# Patient Record
Sex: MALE | Race: WHITE | Employment: PART TIME | URBAN - METROPOLITAN AREA
[De-identification: names, ages, dates, MRNs, and addresses within clinical notes are randomized per-mention and may not be internally consistent; named-entity substitution may affect disease eponyms.]

---

## 2017-02-07 ENCOUNTER — HOSPITAL ENCOUNTER (OUTPATIENT)
Dept: ULTRASOUND IMAGING | Age: 52
Discharge: HOME | End: 2017-02-07
Attending: INTERNAL MEDICINE
Payer: SUBSIDIZED

## 2017-02-07 DIAGNOSIS — R10.11 RUQ ABDOMINAL PAIN: ICD-10-CM

## 2017-02-07 PROCEDURE — 76700 US EXAM ABDOM COMPLETE: CPT

## 2017-04-20 ENCOUNTER — HOSPITAL ENCOUNTER (OUTPATIENT)
Dept: LAB | Age: 52
Discharge: HOME | End: 2017-04-20

## 2017-04-20 LAB
BASOPHILS # BLD AUTO: 0 K/UL (ref 0–0.1)
BASOPHILS # BLD: 0 % (ref 0–1)
EOSINOPHIL # BLD: 0.1 K/UL (ref 0–0.4)
EOSINOPHIL NFR BLD: 1 % (ref 0–7)
ERYTHROCYTE [DISTWIDTH] IN BLOOD BY AUTOMATED COUNT: 12.8 % (ref 11.5–14.5)
ERYTHROCYTE [SEDIMENTATION RATE] IN BLOOD: 13 MM/HR (ref 0–15)
HCT VFR BLD AUTO: 45.5 % (ref 36.6–50.3)
HGB BLD-MCNC: 15.8 G/DL (ref 12.1–17)
LYMPHOCYTES # BLD AUTO: 44 % (ref 12–49)
LYMPHOCYTES # BLD: 2.3 K/UL (ref 0.8–3.5)
MCH RBC QN AUTO: 30 PG (ref 26–34)
MCHC RBC AUTO-ENTMCNC: 34.7 G/DL (ref 30–36.5)
MCV RBC AUTO: 86.3 FL (ref 80–99)
MONOCYTES # BLD: 0.3 K/UL (ref 0–1)
MONOCYTES NFR BLD AUTO: 5 % (ref 5–13)
NEUTS SEG # BLD: 2.5 K/UL (ref 1.8–8)
NEUTS SEG NFR BLD AUTO: 50 % (ref 32–75)
PLATELET # BLD AUTO: 190 K/UL (ref 150–400)
RBC # BLD AUTO: 5.27 M/UL (ref 4.1–5.7)
WBC # BLD AUTO: 5.2 K/UL (ref 4.1–11.1)

## 2017-04-20 PROCEDURE — 85025 COMPLETE CBC W/AUTO DIFF WBC: CPT | Performed by: NURSE PRACTITIONER

## 2017-04-20 PROCEDURE — 86038 ANTINUCLEAR ANTIBODIES: CPT | Performed by: NURSE PRACTITIONER

## 2017-04-20 PROCEDURE — 86803 HEPATITIS C AB TEST: CPT | Performed by: NURSE PRACTITIONER

## 2017-04-20 PROCEDURE — 86200 CCP ANTIBODY: CPT | Performed by: NURSE PRACTITIONER

## 2017-04-20 PROCEDURE — 86140 C-REACTIVE PROTEIN: CPT | Performed by: NURSE PRACTITIONER

## 2017-04-20 PROCEDURE — 85652 RBC SED RATE AUTOMATED: CPT | Performed by: NURSE PRACTITIONER

## 2017-04-21 LAB
CRP SERPL-MCNC: <0.29 MG/DL (ref 0–0.6)
HCV AB SERPL QL IA: NONREACTIVE
HCV COMMENT,HCGAC: NORMAL

## 2017-04-23 LAB
ANA SER QL: NEGATIVE
CCP IGA+IGG SERPL IA-ACNC: 13 UNITS (ref 0–19)
SEE BELOW:, 164879: NORMAL

## 2017-07-28 ENCOUNTER — HOSPITAL ENCOUNTER (EMERGENCY)
Age: 52
Discharge: HOME | End: 2017-07-28
Attending: EMERGENCY MEDICINE
Payer: SUBSIDIZED

## 2017-07-28 VITALS
HEIGHT: 66 IN | BODY MASS INDEX: 31.5 KG/M2 | RESPIRATION RATE: 16 BRPM | TEMPERATURE: 98.2 F | HEART RATE: 74 BPM | OXYGEN SATURATION: 97 % | WEIGHT: 196 LBS | SYSTOLIC BLOOD PRESSURE: 132 MMHG | DIASTOLIC BLOOD PRESSURE: 87 MMHG

## 2017-07-28 DIAGNOSIS — H43.392 FLOATERS IN VISUAL FIELD, LEFT: Primary | ICD-10-CM

## 2017-07-28 LAB
GLUCOSE BLD STRIP.AUTO-MCNC: 146 MG/DL (ref 65–100)
SERVICE CMNT-IMP: ABNORMAL

## 2017-07-28 PROCEDURE — 99284 EMERGENCY DEPT VISIT MOD MDM: CPT

## 2017-07-28 PROCEDURE — 82962 GLUCOSE BLOOD TEST: CPT

## 2017-07-28 PROCEDURE — 74011000250 HC RX REV CODE- 250: Performed by: EMERGENCY MEDICINE

## 2017-07-28 RX ADMIN — FLUORESCEIN SODIUM 1 STRIP: 1 STRIP OPHTHALMIC at 12:58

## 2017-07-28 NOTE — ED PROVIDER NOTES
HPI Comments: 54 y.o. male with past medical history significant for diabetes, HTN, obstructive sleep apnea, GERD, and fatty liver disease who presents from home with chief complaint of visual disturbance. Patient states that he has been having floaters and gray spots in his left eye since yesterday. He reportedly went to his eye doctor a couple weeks ago and had a normal exam. He reports having Lasik surgery a few years ago without complications. Pt denies having any eye curtains or flashes. He denies having any eye pain. There are no other acute medical concerns at this time. Social hx: nonsmoker, no EtOH use, no drug use  PCP: Jacinda Tyler MD    Note written by Castro Henderson, as dictated by Rina Marin MD 12:54 PM      The history is provided by the patient. No  was used. Past Medical History:   Diagnosis Date    Diabetes ([facility] [address]     Esophageal reflux     reported by patient has had 2 endoscopies in [facility] liver     reported by patient seen on u/s in [facility] Hypertension     Sleep apnea, obstructive 4/2014    AHI 32.3, does not have machine       History reviewed. No pertinent surgical history. Family History:   Problem Relation Age of Onset    Heart Disease Father        Social History     Social History    Marital status:      Spouse name: N/A    Number of children: N/A    Years of education: N/A     Occupational History    Not on file. Social History Main Topics    Smoking status: Never Smoker    Smokeless tobacco: Never Used    Alcohol use No    Drug use: No    Sexual activity: Yes     Partners: Female      Comment:      Other Topics Concern    Not on file     Social History Narrative         ALLERGIES: Review of patient's allergies indicates no known allergies. Review of Systems   Constitutional: Negative for activity change and fever. Eyes: Positive for visual disturbance. Negative for pain. Respiratory: Negative for cough and shortness of breath. Cardiovascular: Negative for chest pain and leg swelling. Gastrointestinal: Negative for abdominal pain. Genitourinary: Negative for flank pain and hematuria. Musculoskeletal: Negative for gait problem, neck pain and neck stiffness. Skin: Negative for color change. Neurological: Negative for speech difficulty and headaches. Hematological: Does not bruise/bleed easily. Psychiatric/Behavioral: Negative for confusion. All other systems reviewed and are negative. Vitals:    07/28/17 1242   BP: 126/82   Pulse: 83   Resp: 15   Temp: 98.3 °F (36.8 °C)   SpO2: 98%   Weight: 88.9 kg (196 lb)   Height: 5' 6\" (1.676 m)            Physical Exam   Constitutional: He is oriented to person, place, and time. He appears well-developed and well-nourished. No distress. HENT:   Head: Normocephalic and atraumatic. Right Ear: External ear normal.   Left Ear: External ear normal.   Eyes: EOM and lids are normal. Pupils are equal, round, and reactive to light. Right eye exhibits normal extraocular motion. Left eye exhibits normal extraocular motion. Slit lamp exam:       The left eye shows no corneal abrasion. Pupils 4 mm. Normal visual field and acuity. Neck: Normal range of motion. Neck supple. No JVD present. No tracheal deviation present. Cardiovascular: Normal rate, regular rhythm and normal heart sounds. Exam reveals no gallop and no friction rub. No murmur heard. Pulmonary/Chest: Effort normal and breath sounds normal. No stridor. No respiratory distress. He has no wheezes. He has no rales. Abdominal: Soft. Bowel sounds are normal. He exhibits no distension. There is no tenderness. There is no rebound and no guarding. Musculoskeletal: Normal range of motion. He exhibits no edema or tenderness. Neurological: He is alert and oriented to person, place, and time. He has normal reflexes. No cranial nerve deficit.  Coordination normal. Skin: Skin is warm and dry. No rash noted. He is not diaphoretic. No erythema. Psychiatric: He has a normal mood and affect. His behavior is normal. Judgment and thought content normal.   Nursing note and vitals reviewed. Note written by Castro Lin, as dictated by Jackelyn Treviño MD 12:55 PM    MDM  Number of Diagnoses or Management Options  Diagnosis management comments: 51-year-old male presents with floaters in the left eye. No eye pain. Visual acuity is normal. My exam is normal. He'll need to see ophthalmology. We'll call and see if they can see him either today or Monday. Amount and/or Complexity of Data Reviewed  Decide to obtain previous medical records or to obtain history from someone other than the patient: yes  Review and summarize past medical records: yes  Independent visualization of images, tracings, or specimens: yes    Risk of Complications, Morbidity, and/or Mortality  Presenting problems: low  Diagnostic procedures: low  Management options: low    Patient Progress  Patient progress: improved    ED Course       Procedures  PROGRESS NOTE:  2:12 PM Pt has an appointment with Dr. Alex Manning at [facility] today. We were able to make this appt for the pt. He is on his way there now. 2:13 PM  Good return precautions given to patient. Close follow up with PCP recommended. Patient and/or family voices understanding of this plan. Discharge instructions were explained by me and all concerns were addressed.

## 2017-07-28 NOTE — ED TRIAGE NOTES
\"I started having floaters in my visual field yesterday that has gotten worse today. My left eye has become painful\". Denies injury.

## 2017-07-28 NOTE — DISCHARGE INSTRUCTIONS
Floaters and Flashes: Care Instructions  Your Care Instructions    Floaters are spots and lines that \"float\" across your field of vision. They are caused by stray cells or strands of tissue inside the eyeball. Flashes are sparkles or lightning streaks. These occur in your side vision. This is called the peripheral vision. Floaters and flashes usually aren't serious. In many cases, they're a normal part of getting older. Some people get used to them. Others find them annoying. If floaters bother you, you can try to look up and then down. This may make them go away. For now, your doctor doesn't think your symptoms are a sign of a more serious problem. But an eye exam is the only way to know for sure. Follow-up care is a key part of your treatment and safety. Be sure to make and go to all appointments, and call your doctor if you are having problems. It's also a good idea to know your test results and keep a list of the medicines you take. When should you call for help? Call your doctor now or seek immediate medical care if:  · You have vision changes. Watch closely for changes in your health, and be sure to contact your doctor if:  · You see new floaters. · You see new flashes of light. · You do not get better as expected. Where can you learn more? Go to http://eren-jennifer.info/. Enter U596 in the search box to learn more about \"Floaters and Flashes: Care Instructions. \"  Current as of: March 14, 2017  Content Version: 11.3  © 9328-2443 [employer]. Care instructions adapted under license by [employer] (which disclaims liability or warranty for this information). If you have questions about a medical condition or this instruction, always ask your healthcare professional. [address] any warranty or liability for your use of this information.

## 2017-10-21 ENCOUNTER — HOSPITAL ENCOUNTER (EMERGENCY)
Age: 52
Discharge: HOME | End: 2017-10-21
Attending: EMERGENCY MEDICINE
Payer: SUBSIDIZED

## 2017-10-21 ENCOUNTER — APPOINTMENT (OUTPATIENT)
Dept: GENERAL RADIOLOGY | Age: 52
End: 2017-10-21
Attending: EMERGENCY MEDICINE
Payer: SUBSIDIZED

## 2017-10-21 VITALS
SYSTOLIC BLOOD PRESSURE: 121 MMHG | BODY MASS INDEX: 31.82 KG/M2 | DIASTOLIC BLOOD PRESSURE: 78 MMHG | HEIGHT: 66 IN | TEMPERATURE: 98.7 F | RESPIRATION RATE: 18 BRPM | OXYGEN SATURATION: 97 % | HEART RATE: 86 BPM | WEIGHT: 198 LBS

## 2017-10-21 DIAGNOSIS — S90.851A FOREIGN BODY IN FOOT, RIGHT, INITIAL ENCOUNTER: Primary | ICD-10-CM

## 2017-10-21 PROCEDURE — 99282 EMERGENCY DEPT VISIT SF MDM: CPT

## 2017-10-21 PROCEDURE — 73630 X-RAY EXAM OF FOOT: CPT

## 2017-10-21 NOTE — LETTER
[address] 
[address] [facility] [address] [id number] 
[phone] Work/School Note Date: 10/21/2017 To Whom It May concern: 
 
Jayden Estes was seen and treated today in the emergency room by the following provider(s): 
No providers found. Jayden Estes may return to work on 10/22/2017. Sincerely, Amanda Shay RN

## 2017-10-21 NOTE — ED TRIAGE NOTES
Patient states he stepped on something yesterday and tried to get it out but feels like something is stuck in his right heel and it is painful to walk on it. He does not know what he stepped on.

## 2017-10-21 NOTE — DISCHARGE INSTRUCTIONS
Object in the Skin: Care Instructions  Your Care Instructions  Small objects (splinters) of wood, metal, glass, or plastic can become embedded in the skin. Thorns from [facility] and other plants also can prick or become stuck in the skin. Splinters can cause an infection if they are not removed. Your doctor probably removed the object and cleaned the skin well. Your doctor may have given you antibiotics to prevent infection and a tetanus shot if you had not had one in the last 5 years or do not know when you had your last one. For a few days, you may have pain and itching in the wound where the object was removed. Follow-up care is a key part of your treatment and safety. Be sure to make and go to all appointments, and call your doctor if you are having problems. It's also a good idea to know your test results and keep a list of the medicines you take. How can you care for yourself at home? · If your doctor told you how to care for your wound, follow your doctor's instructions. If you did not get instructions, follow this general advice:  ¨ Wash the wound with clean water 2 times a day. Don't use hydrogen peroxide or alcohol, which can slow healing. ¨ You may cover the wound with a thin layer of petroleum jelly, such as Vaseline, and a nonstick bandage. ¨ Apply more petroleum jelly and replace the bandage as needed. · Your doctor may have used medicine to numb your skin. When it wears off, your pain may return. Take an over-the-counter pain medicine, such as acetaminophen (Tylenol), ibuprofen (Advil, Motrin), or naproxen (Aleve). Read and follow all instructions on the label. · Do not take two or more pain medicines at the same time unless the doctor told you to. Many pain medicines have acetaminophen, which is Tylenol. Too much acetaminophen (Tylenol) can be harmful. · If your doctor prescribed antibiotics, take them as directed. Do not stop taking them just because you feel better.  You need to take the full course of antibiotics. · After 2 or 3 days, if your swelling is gone, apply a heating pad set on low or a warm cloth to your wound area. Some doctors suggest that you go back and forth between hot and cold. Put a thin cloth between the heating pad and your skin. · It may help to prop up the affected part of your body on a pillow anytime you sit or lie down during the next 3 days. Try to keep it above the level of your heart. This will help reduce swelling. · Your wound may itch or feel irritated. A little redness and swelling is normal. Do not scratch or rub the wound. When should you call for help? Call your doctor now or seek immediate medical care if:  · The skin near the wound is cool or pale or changes color. · You have tingling, weakness, or numbness in the area near the wound. · The wound starts to bleed, and blood soaks the bandage. Oozing small amounts of blood is normal.  · You have trouble moving a limb near the wound. · You have signs of infection, such as:  ¨ Increased pain, swelling, warmth, or redness. ¨ Red streaks leading from the wound. ¨ Pus draining from the wound. ¨ A fever. Watch closely for changes in your health, and be sure to contact your doctor if:  · You do not get better as expected. Where can you learn more? Go to http://eren-jennifer.info/. Enter Godfrey Parikh in the search box to learn more about \"Object in the Skin: Care Instructions. \"  Current as of: March 20, 2017  Content Version: 11.3  © 9088-1450 [employer]. Care instructions adapted under license by [employer] (which disclaims liability or warranty for this information). If you have questions about a medical condition or this instruction, always ask your healthcare professional. [address] any warranty or liability for your use of this information.

## 2017-10-21 NOTE — ED PROVIDER NOTES
HPI Comments: 54 y.o. male with past medical history significant for DM, HTN, sleep apnea, reflux, and fatty liver who presents with chief complaint of R foot pain. The pt c/o pain over his R heel that has been ongoing since last night. The pt explains that he was barefoot and he believes that he stepped on an object about 12 hours ago at his house. The pt believes there is a foreign body in his heel. The pt indicates the he tried to remove the object but was unsuccessful. The pt notes pain with weightbearing. There are no other acute medical concerns at this time. Social hx: nonsmoker  PCP: Royal Lee MD    Note written by Castro Osborn, as dictated by Anthony Anderson MD 9:54 AM      The history is provided by the patient. No  was used. Past Medical History:   Diagnosis Date    Diabetes ([address]     Esophageal reflux     reported by patient has had 2 endoscopies in [facility] liver     reported by patient seen on u/s in [facility] Hypertension     Sleep apnea, obstructive 4/2014    AHI 32.3, does not have machine       History reviewed. No pertinent surgical history. Family History:   Problem Relation Age of Onset    Heart Disease Father        Social History     Social History    Marital status:      Spouse name: N/A    Number of children: N/A    Years of education: N/A     Occupational History    Not on file. Social History Main Topics    Smoking status: Never Smoker    Smokeless tobacco: Never Used    Alcohol use No    Drug use: No    Sexual activity: Yes     Partners: Female      Comment:      Other Topics Concern    Not on file     Social History Narrative         ALLERGIES: Review of patient's allergies indicates no known allergies. Review of Systems   Constitutional: Negative for chills, diaphoresis and fever. HENT: Negative for congestion, postnasal drip, rhinorrhea and sore throat.     Eyes: Negative for photophobia, discharge, redness and visual disturbance. Respiratory: Negative for cough, chest tightness, shortness of breath and wheezing. Cardiovascular: Negative for chest pain, palpitations and leg swelling. Gastrointestinal: Negative for abdominal distention, abdominal pain, blood in stool, constipation, diarrhea, nausea and vomiting. Genitourinary: Negative for difficulty urinating, dysuria, frequency, hematuria and urgency. Musculoskeletal: Positive for myalgias (R heel). Negative for arthralgias, back pain and joint swelling. Skin: Negative for color change and rash. Neurological: Negative for dizziness, speech difficulty, weakness, light-headedness, numbness and headaches. Psychiatric/Behavioral: Negative for confusion. The patient is not nervous/anxious. All other systems reviewed and are negative. Vitals:    10/21/17 0915   BP: 121/78   Pulse: 86   Resp: 18   Temp: 98.7 °F (37.1 °C)   SpO2: 97%   Weight: 89.8 kg (198 lb)   Height: 5' 6\" (1.676 m)            Physical Exam   Constitutional: He is oriented to person, place, and time. He appears well-developed and well-nourished. No distress. HENT:   Head: Normocephalic and atraumatic. Right Ear: External ear normal.   Left Ear: External ear normal.   Nose: Nose normal.   Mouth/Throat: Oropharynx is clear and moist.   Eyes: Conjunctivae and EOM are normal. Pupils are equal, round, and reactive to light. No scleral icterus. Neck: Normal range of motion. Neck supple. No JVD present. No tracheal deviation present. No thyromegaly present. Cardiovascular: Normal rate, regular rhythm and normal heart sounds. Exam reveals no gallop and no friction rub. No murmur heard. Pulmonary/Chest: Effort normal and breath sounds normal. No respiratory distress. He has no wheezes. He has no rales. He exhibits no tenderness. Abdominal: Soft. Bowel sounds are normal. He exhibits no distension and no mass. There is no tenderness.  There is no rebound and no guarding. Musculoskeletal: Normal range of motion. He exhibits tenderness. He exhibits no edema. Pinpoint tenderness over the R heel with a small possible break in the skin. Lymphadenopathy:     He has no cervical adenopathy. Neurological: He is alert and oriented to person, place, and time. He has normal strength. He displays no atrophy and no tremor. No cranial nerve deficit. He exhibits normal muscle tone. Coordination and gait normal.   Skin: Skin is warm and dry. No rash noted. He is not diaphoretic. No erythema. Psychiatric: He has a normal mood and affect. His behavior is normal. Judgment and thought content normal.   Nursing note and vitals reviewed. Note written by Castro Aguirre, as dictated by Nahum Oneil MD 9:54 AM      MDM  Number of Diagnoses or Management Options  Foreign body in foot, right, initial encounter:   Diagnosis management comments: WASSEF  Impression: 53-year-old male presenting to the emergency department with a sensation of a foreign body in his right heel. He was walking barefoot in his home.     Plan of care will be x-ray performed body found we'll refer to surgery for removal.    ED Course       Procedures

## 2017-10-23 ENCOUNTER — OFFICE VISIT (OUTPATIENT)
Dept: SURGERY | Age: 52
End: 2017-10-23

## 2017-10-23 VITALS
SYSTOLIC BLOOD PRESSURE: 120 MMHG | OXYGEN SATURATION: 98 % | HEIGHT: 66 IN | HEART RATE: 87 BPM | WEIGHT: 198 LBS | BODY MASS INDEX: 31.82 KG/M2 | TEMPERATURE: 98.3 F | RESPIRATION RATE: 16 BRPM | DIASTOLIC BLOOD PRESSURE: 70 MMHG

## 2017-10-23 DIAGNOSIS — S90.851A FOREIGN BODY IN RIGHT FOOT, INITIAL ENCOUNTER: Primary | ICD-10-CM

## 2017-10-23 NOTE — MR AVS SNAPSHOT
Visit Information Date & Time Provider Department Dept. Phone Encounter #  
 10/23/2017  2:00 PM Marhumphrey HernandezCookie [id number] [phone] [id number] Your Appointments 10/24/2017 10:00 AM  
Any with Alex Vásquez MD  
[facility] ([address]) Appt Note: yrly cpap follow up [place] [phone] Upcoming Health Maintenance Date Due Pneumococcal 19-64 Medium Risk (1 of 1 - PPSV23) 11/8/1986 HEMOGLOBIN A1C Q6M 9/2/2015 FOOT EXAM Q1 12/1/2015 EYE EXAM RETINAL OR DILATED Q1 12/1/2015 MICROALBUMIN Q1 1/15/2016 LIPID PANEL Q1 2/16/2017 INFLUENZA AGE 9 TO ADULT 8/1/2017 DTaP/Tdap/Td series (2 - Td) 11/18/2024 Allergies as of 10/23/2017  Review Complete On: 10/23/2017 By: Meaghan Hernandez MD  
 No Known Allergies Current Immunizations  Reviewed on 11/18/2014 Name Date Influenza Vaccine (Quad) PF 11/18/2014 MMR 11/18/2014 TB Skin Test (PPD) Intradermal 11/18/2014 Tdap 11/18/2014 Not reviewed this visit You Were Diagnosed With   
  
 Codes Comments Foreign body in right foot, initial encounter    -  Primary ICD-10-CM: V00.801J ICD-9-CM: 917.6 Vitals BP Pulse Temp Resp Height(growth percentile) Weight(growth percentile) 120/70 (BP 1 Location: Right arm, BP Patient Position: Sitting) 87 98.3 °F (36.8 °C) (Oral) 16 5' 6\" (1.676 m) 198 lb (89.8 kg) SpO2 BMI Smoking Status 98% 31.96 kg/m2 Never Smoker BMI and BSA Data Body Mass Index Body Surface Area 31.96 kg/m 2 2.04 m 2 Preferred Pharmacy Pharmacy Name Phone [facility], [address] [place] [address] [phone] Your Updated Medication List  
  
   
 This list is accurate as of: 10/23/17  2:25 PM.  Always use your most recent med list.  
  
  
  
  
 aspirin delayed-release 81 mg tablet Take 1 tablet by mouth daily. Blood-Glucose Meter monitoring kit Use to measure blood sugar 2 time daily and as needed. CYANOCOBALAMIN (VITAMIN B-12) PO Take  by mouth. glucose blood VI test strips strip Commonly known as:  ASCENSIA AUTODISC VI, ONE TOUCH ULTRA TEST VI  
Use to measure blood sugar 2 times daily and as needed Lancets Misc Use to measure blood sugar 2 times daily and as needed  
  
 metFORMIN 850 mg tablet Commonly known as:  GLUCOPHAGE Take 1 Tab by mouth two (2) times daily (with meals). Omeprazole delayed release 20 mg tablet Commonly known as:  PRILOSEC D/R Take 1 tablet by mouth daily. As needed for hiccups or heartburn  
  
 rifAMPin 300 mg capsule Commonly known as:  RIFADIN Take 2 Caps by mouth Daily (before breakfast). sildenafil citrate 25 mg tablet Commonly known as:  VIAGRA Take 1 tablet by mouth as needed. Introducing [facility]! Dear Merna Osgood: Thank you for requesting a [employer] account. Our records indicate that you already have an active [employer] account. You can access your account anytime at [email or web address] Did you know that you can access your hospital and ER discharge instructions at any time in [employer]? You can also review all of your test results from your hospital stay or ER visit. Additional Information If you have questions, please visit the Frequently Asked Questions section of the [employer] website at [email or web address] Remember, [employer] is NOT to be used for urgent needs. For medical emergencies, dial 911. Now available from your iPhone and Android! Please provide this summary of care documentation to your next provider. Your primary care clinician is listed as Red Whitmore. If you have any questions after today's visit, please call [phone].

## 2017-10-23 NOTE — LETTER
10/23/2017 2:25 PM 
 
Mr. Lui Christopher [address] [place] [phone] To whom it may concern, The above captioned has an injury to his foot that requires him to not stand or walk for at least a week. I will see him back in the office in one week and determine at that time when he may return to work. Sincerely, Judith Martinez MD

## 2017-10-23 NOTE — PROGRESS NOTES
Surgery Consult    Subjective:      Antony Yanes is a 54 y.o.  male who was referred by [facility] (PCP is Dr Krystyna Bird) for evaluation of foreign object in foot. The pt states that he entered his bedroom recently and despite the floor being visibly clean, something sharp penetrated the heel of his right foot. He was unable to extract it himself and claims that it hurts upon palpation or when he puts any stress on it (i.e., . He went to the ER told him they did an X-Ray and that he would have to go through surgery to have it removed. XR FOOT RT MIN 3 V from 10/21/2017:   FINDINGS:  Three views of the right foot demonstrate no fracture or other acute osseous or articular abnormality. There is a 2 to 3 mm linear, radiopaque foreign body within the skin along the plantar aspect of the heel. No other soft tissue abnormalities are evident. IMPRESSION:    Tiny superficial radiopaque foreign body. I independently reviewed these images. Chief Complaint   Patient presents with    Foreign Body Removal     right heel       Patient Active Problem List    Diagnosis Date Noted    Foreign body in foot, right 10/23/2017    Insomnia 03/02/2015    HLD (hyperlipidemia) 03/02/2015    TB lung, latent 01/05/2015    Erectile dysfunction associated with type 2 diabetes mellitus ([address] 11/05/2014    DMII (diabetes mellitus, type 2) ([address] 11/05/2014    Sleep apnea, obstructive 11/05/2014     Past Medical History:   Diagnosis Date    Diabetes ([address]     Esophageal reflux     reported by patient has had 2 endoscopies in [facility] liver     reported by patient seen on u/s in [facility] Foreign body in foot, right 10/23/2017    Hypertension     Sleep apnea, obstructive 4/2014    AHI 32.3, does not have machine      History reviewed. No pertinent surgical history.    Social History   Substance Use Topics    Smoking status: Never Smoker    Smokeless tobacco: Never Used    Alcohol use No      Family History   Problem Relation Age of Onset    Heart Disease Father       Current Outpatient Prescriptions   Medication Sig    CYANOCOBALAMIN, VITAMIN B-12, PO Take  by mouth.  metFORMIN (GLUCOPHAGE) 850 mg tablet Take 1 Tab by mouth two (2) times daily (with meals).  Blood-Glucose Meter monitoring kit Use to measure blood sugar 2 time daily and as needed.  Lancets misc Use to measure blood sugar 2 times daily and as needed    glucose blood VI test strips (ASCENSIA AUTODISC VI, ONE TOUCH ULTRA TEST VI) strip Use to measure blood sugar 2 times daily and as needed    sildenafil citrate (VIAGRA) 25 mg tablet Take 1 tablet by mouth as needed.  rifampin (RIFADIN) 300 mg capsule Take 2 Caps by mouth Daily (before breakfast).  aspirin delayed-release 81 mg tablet Take 1 tablet by mouth daily.  Omeprazole delayed release (PRILOSEC D/R) 20 mg tablet Take 1 tablet by mouth daily. As needed for hiccups or heartburn     No current facility-administered medications for this visit. No Known Allergies     Review of Systems:    A comprehensive review of systems was negative except for that written in the History of Present Illness. Objective:     Visit Vitals    /70 (BP 1 Location: Right arm, BP Patient Position: Sitting)    Pulse 87    Temp 98.3 °F (36.8 °C) (Oral)    Resp 16    Ht 5' 6\" (1.676 m)    Wt 198 lb (89.8 kg)    SpO2 98%    BMI 31.96 kg/m2         Physical Exam:  General appearance: alert, cooperative, no distress, appears stated age  Head: Normocephalic, without obvious abnormality, atraumatic  Neurologic: Grossly normal  Extremities: On the heel of his right foot, there is an area of about 7mm in size raised, firm, tender surrounding the embedded metal object which is only about 3mm in size. Assessment:       ICD-10-CM ICD-9-CM    1.  Foreign body in right foot, initial encounter S90.851A 917.6          Plan:     Pt will soak it in soap and water 2-3x/day for ~15 minutes to soften the skin and promote the natural removal of the object. He will also stay off of the foot for the duration of this recovery. Pt will f/u next week. Written by rosa Merino, as dictated by Hannah Jennings MD.    Total face to face time with patient: 5 minutes. Greater than 50% of the time was spent in counseling. Signed By: Hannah Jennings MD     October 23, 2017

## 2017-10-23 NOTE — PROGRESS NOTES
1. Have you been to the ER, urgent care clinic since your last visit? Hospitalized since your last visit? [place]  10-21-17 foreign body in right heel    2. Have you seen or consulted any other health care providers outside of the [facility] [place] since your last visit? Include any pap smears or colon screening.  No

## 2017-10-24 ENCOUNTER — OFFICE VISIT (OUTPATIENT)
Dept: SLEEP MEDICINE | Age: 52
End: 2017-10-24

## 2017-10-24 VITALS
SYSTOLIC BLOOD PRESSURE: 129 MMHG | HEIGHT: 66 IN | DIASTOLIC BLOOD PRESSURE: 83 MMHG | WEIGHT: 194.2 LBS | OXYGEN SATURATION: 98 % | BODY MASS INDEX: 31.21 KG/M2 | HEART RATE: 72 BPM

## 2017-10-24 DIAGNOSIS — G47.33 OSA (OBSTRUCTIVE SLEEP APNEA): Primary | ICD-10-CM

## 2017-10-24 RX ORDER — PREGABALIN 75 MG/1
CAPSULE ORAL
COMMUNITY
End: 2019-07-01

## 2017-10-24 RX ORDER — GLIPIZIDE 5 MG/1
TABLET ORAL 2 TIMES DAILY
COMMUNITY
End: 2019-05-30

## 2017-10-24 NOTE — PROGRESS NOTES
[address] [facility]. [place], [address]  Tel.  [phone]  Fax. 100 [facility] [address]  [place], [address]  Tel.  [phone]  Fax. [phone] [address] [facility] [address]  Tel.  [phone]  Fax. [phone]     S>Uche Irvin is a 54 y.o. male seen for a positive airway pressure follow-up. He reports problems using the device. He is 80% compliant over the past 30 days. The following problems are identified:    Drowsiness no Problems exhaling no   Snoring no Forget to put on no   Mask Comfortable no Can't fall asleep no   Dry Mouth no Mask falls off no   Air Leaking yes Frequent awakenings no         He admits that his sleep has improved. He gets a smell from the device on some nights and is noisy causing sleep disruption. He feels that he does not get enough air. He reports of difficulties in falling asleep without advil / Lyrica even when he uses PAP. No Known Allergies    He has a current medication list which includes the following prescription(s): glipizide, pregabalin, metformin, blood-glucose meter, lancets, glucose blood vi test strips, sildenafil citrate, cyanocobalamin (vitamin b-12), rifampin, aspirin delayed-release, and omeprazole delayed release. Cecy Riser He  has a past medical history of Diabetes ([address] Esophageal reflux; Fatty liver; Foreign body in foot, right (10/23/2017); Hypertension; and Sleep apnea, obstructive (4/2014). [place] Sleepiness Score: 7   and Modified F.O.S.Q. Score Total / 2: 12   which reflect improved sleep quality over therapy time.     O>    Visit Vitals    /83    Pulse 72    Ht 5' 6\" (1.676 m)    Wt 194 lb 3.2 oz (88.1 kg)    SpO2 98%    BMI 31.34 kg/m2         General:   Not in acute distress   Eyes:  Anicteric sclerae, no obvious strabismus   Nose:  No obvious nasal septum deviation    Oropharynx:   Class 4 oropharyngeal outlet, thick tongue base, uvula not seen due to low-lying soft palate, narrow tonsilo-pharyngeal pilars   Tonsils:   tonsils are not visualized due to low-lying soft palate   Neck:   midline trachea   Chest/Lungs:  Equal lung expansion, clear on auscultation    CVS:  Normal rate, regular rhythm; no JVD   Skin:  Warm to touch; no obvious rashes   Neuro:  No focal deficits ; no obvious tremor    Psych:  Normal affect,  normal countenance;           A>    ICD-10-CM ICD-9-CM    1. JOE (obstructive sleep apnea) G47.33 327.23 AMB SUPPLY ORDER      SLEEP LAB (PAP TITRATION)   2. BMI 31.0-31.9,adult Z68.31 V85.31      AHI = 32. On CPAP :  08 cmH2O. Compliant:      yes    Therapeutic Response:  Negative    P>  * Mask evaluation done in the office - see tech notes. * Bi-Level titration ordered due to AHI 4.7 on current therapy. New donated device to be prescribed after testing. * We have recommended a dedicated weight loss through appropriate diet and an exercise regiment as significant weight reduction has been shown to reduce severity of obstructive sleep apnea. * Follow-up Disposition:  Return if symptoms worsen or fail to improve. * He was asked to contact our office for any problems regarding PAP therapy. * Counseling was provided regarding the importance of regular PAP use and on proper sleep hygiene and safe driving. * Re-enforced proper and regular cleaning for the device. Thank you for allowing us to participate in your patient's medical care. Rojas Jalloh MD, [facility]  Electronically signed.  10/24/17

## 2017-10-24 NOTE — PATIENT INSTRUCTIONS
[address] [facility] [place]. [place], [address]  Tel.  [phone]  Fax. 100 [facility] 60  [place], [address] [facility]  Tel.  [phone]  Fax. [phone] [address]  Tel.  [phone]  Fax. [phone]     Learning About CPAP for Sleep Apnea  What is CPAP? CPAP is a small machine that you use at home every night while you sleep. It increases air pressure in your throat to keep your airway open. When you have sleep apnea, this can help you sleep better so you feel much better. CPAP stands for \"continuous positive airway pressure. \"  The CPAP machine will have one of the following:  · A mask that covers your nose and mouth  · Prongs that fit into your nose  · A mask that covers your nose only, the most common type. This type is called NCPAP. The N stands for \"nasal.\"  Why is it done? CPAP is usually the best treatment for obstructive sleep apnea. It is the first treatment choice and the most widely used. Your doctor may suggest CPAP if you have:  · Moderate to severe sleep apnea. · Sleep apnea and coronary artery disease (CAD) or heart failure. How does it help? · CPAP can help you have more normal sleep, so you feel less sleepy and more alert during the daytime. · CPAP may help keep heart failure or other heart problems from getting worse. · NCPAP may help lower your blood pressure. · If you use CPAP, your bed partner may also sleep better because you are not snoring or restless. What are the side effects? Some people who use CPAP have:  · A dry or stuffy nose and a sore throat. · Irritated skin on the face. · Sore eyes. · Bloating. If you have any of these problems, work with your doctor to fix them. Here are some things you can try:  · Be sure the mask or nasal prongs fit well. · See if your doctor can adjust the pressure of your CPAP. · If your nose is dry, try a humidifier.   · If your nose is runny or stuffy, try decongestant medicine or a steroid nasal spray. If these things do not help, you might try a different type of machine. Some machines have air pressure that adjusts on its own. Others have air pressures that are different when you breathe in than when you breathe out. This may reduce discomfort caused by too much pressure in your nose. Where can you learn more? Go to [employer]  Enter Wil Silverman in the search box to learn more about \"Learning About CPAP for Sleep Apnea. \"   © 1471-7097 Healthwise, Incorporated. Care instructions adapted under license by [facility] [employer] (which disclaims liability or warranty for this information). This care instruction is for use with your licensed healthcare professional. If you have questions about a medical condition or this instruction, always ask your healthcare professional. [address] any warranty or liability for your use of this information. Content Version: 2.1.39035; Last Revised: January 11, 2010  PROPER SLEEP HYGIENE    What to avoid  · Do not have drinks with caffeine, such as coffee or black tea, for 8 hours before bed. · Do not smoke or use other types of tobacco near bedtime. Nicotine is a stimulant and can keep you awake. · Avoid drinking alcohol late in the evening, because it can cause you to wake in the middle of the night. · Do not eat a big meal close to bedtime. If you are hungry, eat a light snack. · Do not drink a lot of water close to bedtime, because the need to urinate may wake you up during the night. · Do not read or watch TV in bed. Use the bed only for sleeping and sexual activity. What to try  · Go to bed at the same time every night, and wake up at the same time every morning. Do not take naps during the day. · Keep your bedroom quiet, dark, and cool. · Get regular exercise, but not within 3 to 4 hours of your bedtime. .  · Sleep on a comfortable pillow and mattress.   · If watching the clock makes you anxious, turn it facing away from you so you cannot see the time. · If you worry when you lie down, start a worry book. Well before bedtime, write down your worries, and then set the book and your concerns aside. · Try meditation or other relaxation techniques before you go to bed. · If you cannot fall asleep, get up and go to another room until you feel sleepy. Do something relaxing. Repeat your bedtime routine before you go to bed again. · Make your house quiet and calm about an hour before bedtime. Turn down the lights, turn off the TV, log off the computer, and turn down the volume on music. This can help you relax after a busy day. Drowsy Driving: The [employer] cites drowsiness as a causing factor in more than 927,386 police reported crashes annually, resulting in 76,000 injuries and 1,500 deaths. Other surveys suggest 55% of people polled have driven while drowsy in the past year, 23% had fallen asleep but not crashed, 3% crashed, and 2% had and accident due to drowsy driving. Who is at risk? Young Drivers: One study of drowsy driving accidents states that 55% of the drivers were under 25 years. Of those, 75% were male. Shift Workers and Travelers: People who work overnight or travel across time zones frequently are at higher risk of experiencing Circadian Rhythm Disorders. They are trying to work and function when their body is programed to sleep. Sleep Deprived: Lack of sleep has a serious impact on your ability to pay attention or focus on a task. Consistently getting less than the average of 8 hours your body needs creates partial or cumulative sleep deprivation. Untreated Sleep Disorders: Sleep Apnea, Narcolepsy, R.L.S., and other sleep disorders (untreated) prevent a person from getting enough restful sleep. This leads to excessive daytime sleepiness and increases the risk for drowsy driving accidents by up to 7 times.   Medications / Alcohol: Even over the counter medications can cause drowsiness. Medications that impair a drivers attention should have a warning label. Alcohol naturally makes you sleepy and on its own can cause accidents. Combined with excessive drowsiness its effects are amplified. Signs of Drowsy Driving:   * You don't remember driving the last few miles   * You may drift out of your gracie   * You are unable to focus and your thoughts wander   * You may yawn more often than normal   * You have difficulty keeping your eyes open / nodding off   * Missing traffic signs, speeding, or tailgating  Prevention-   Good sleep hygiene, lifestyle and behavioral choices have the most impact on drowsy driving. There is no substitute for sleep and the average person requires 8 hours nightly. If you find yourself driving drowsy, stop and sleep. Consider the sleep hygiene tips provided during your visit as well. Medication Refill Policy: Refills for all medications require 1 week advance notice. Please have your pharmacy fax a refill request. We are unable to fax, or call in \"controled substance\" medications and you will need to pick these prescriptions up from our office. [employer] Activation    Thank you for requesting access to [employer]. Please follow the instructions below to securely access and download your online medical record. [employer] allows you to send messages to your doctor, view your test results, renew your prescriptions, schedule appointments, and more. How Do I Sign Up? 1. In your internet browser, go to [email or web address]. 2. Click on the First Time User? Click Here link in the Sign In box. You will see the New Member Sign Up page. 3. Enter your [employer] Access Code exactly as it appears below. You will not need to use this code after youve completed the sign-up process. If you do not sign up before the expiration date, you must request a new code. [employer] Access Code:  Activation code not generated  Current Tesaris Status: Active (This is the date your Tesaris access code will )    4. Enter the last four digits of your Social Security Number (xxxx) and Date of Birth (mm/dd/yyyy) as indicated and click Submit. You will be taken to the next sign-up page. 5. Create a [employer] ID. This will be your Tesaris login ID and cannot be changed, so think of one that is secure and easy to remember. 6. Create a Tesaris password. You can change your password at any time. 7. Enter your Password Reset Question and Answer. This can be used at a later time if you forget your password. 8. Enter your e-mail address. You will receive e-mail notification when new information is available in [address] 9. Click Sign Up. You can now view and download portions of your medical record. 10. Click the Download Summary menu link to download a portable copy of your medical information. Additional Information    If you have questions, please call [phone]. Remember, [employer]is is NOT to be used for urgent needs. For medical emergencies, dial 911.

## 2017-10-25 ENCOUNTER — DOCUMENTATION ONLY (OUTPATIENT)
Dept: SLEEP MEDICINE | Age: 52
End: 2017-10-25

## 2017-11-01 ENCOUNTER — OFFICE VISIT (OUTPATIENT)
Dept: SURGERY | Age: 52
End: 2017-11-01

## 2017-11-01 VITALS
SYSTOLIC BLOOD PRESSURE: 120 MMHG | WEIGHT: 194 LBS | HEART RATE: 88 BPM | RESPIRATION RATE: 16 BRPM | DIASTOLIC BLOOD PRESSURE: 70 MMHG | HEIGHT: 66 IN | TEMPERATURE: 98.1 F | OXYGEN SATURATION: 98 % | BODY MASS INDEX: 31.18 KG/M2

## 2017-11-01 DIAGNOSIS — S90.851D FOREIGN BODY IN RIGHT FOOT, SUBSEQUENT ENCOUNTER: Primary | ICD-10-CM

## 2017-11-01 NOTE — PROGRESS NOTES
Subjective:      Horacio Araujo is a 54 y.o.  male who presents with a f/u to his last visit from 10/23/2017 about a foreign body embedded in the sole of his right foot. The pt states that there is very little pain now and that it is much better than it was before. He can walk with more normalcy than he could before. Chief Complaint   Patient presents with    Follow-up     follow up foreign body in right heel       Patient Active Problem List    Diagnosis Date Noted    Foreign body in foot, right 10/23/2017    Insomnia 03/02/2015    HLD (hyperlipidemia) 03/02/2015    TB lung, latent 01/05/2015    Erectile dysfunction associated with type 2 diabetes mellitus ([facility] 11/05/2014    DMII (diabetes mellitus, type 2) ([facility] 11/05/2014    Sleep apnea, obstructive 11/05/2014     Past Medical History:   Diagnosis Date    Diabetes ([facility]     Esophageal reflux     reported by patient has had 2 endoscopies in [facility] liver     reported by patient [facility] u/s in [facility] Foreign body in foot, right 10/23/2017    Hypertension     Sleep apnea, obstructive 4/2014    AHI 32.3, does not have machine      No past surgical history on file. Social History   Substance Use Topics    Smoking status: Never Smoker    Smokeless tobacco: Never Used    Alcohol use No      Family History   Problem Relation Age of Onset    Heart Disease Father       Prior to Admission medications    Medication Sig Start Date End Date Taking? Authorizing Provider   glipiZIDE (GLUCOTROL) 5 mg tablet Take  by mouth two (2) times a day. Yes Historical Provider   pregabalin (LYRICA) 75 mg capsule Take  by mouth. Yes Historical Provider   CYANOCOBALAMIN, VITAMIN B-12, PO Take  by mouth. Yes Historical Provider   metFORMIN (GLUCOPHAGE) 850 mg tablet Take 1 Tab by mouth two (2) times daily (with meals). 3/2/15  Yes Ladan Diaz MD   rifampin (RIFADIN) 300 mg capsule Take 2 Caps by mouth Daily (before breakfast).  3/2/15 Yes Jimy Sanchez MD   aspirin delayed-release 81 mg tablet Take 1 tablet by mouth daily. 1/15/15  Yes Jimy Sanchez MD   Omeprazole delayed release (PRILOSEC D/R) 20 mg tablet Take 1 tablet by mouth daily. As needed for hiccups or heartburn 12/11/14  Yes Jimy Sanchez MD   Blood-Glucose Meter monitoring kit Use to measure blood sugar 2 time daily and as needed. 11/18/14  Yes Jimy Sanchez MD   Lancets misc Use to measure blood sugar 2 times daily and as needed 11/18/14  Yes Jimy Sanchez MD   glucose blood VI test strips (ASCENSIA AUTODISC VI, ONE TOUCH ULTRA TEST VI) strip Use to measure blood sugar 2 times daily and as needed 11/18/14  Yes Jimy Sanchez MD   sildenafil citrate (VIAGRA) 25 mg tablet Take 1 tablet by mouth as needed. 11/5/14  Yes Jimy Sanchez MD     No Known Allergies      Review of Systems:    A comprehensive review of systems was negative except for that written in the History of Present Illness. Objective:     Visit Vitals    /70 (BP 1 Location: Left arm, BP Patient Position: Sitting)    Pulse 88    Temp 98.1 °F (36.7 °C) (Oral)    Resp 16    Ht 5' 6\" (1.676 m)    Wt 194 lb (88 kg)    SpO2 98%    BMI 31.31 kg/m2       Physical Exam:  General appearance: alert, cooperative, no distress, appears stated age  Head: Normocephalic, without obvious abnormality, atraumatic  Neurologic: Grossly normal  Extremities: There is still a small, raised area on his right heel that has diminished significantly from the previous exam.      Assessment:       ICD-10-CM ICD-9-CM    1. Foreign body in right foot, subsequent encounter S90.851D V58.89        Plan:     Pt will return to work on F-11/03/2017. He will f/u PRN. Written by rosa Dillard, as dictated by Chandler Siemens Brandon Sol, MD.    Total face to face time with patient: 3 minutes. Greater than 50% of the time was spent in counseling. Signed By: Chandler Siemens Brandon Sol, MD    November 1, 2017

## 2017-11-01 NOTE — LETTER
NOTIFICATION OF RETURN TO WORK / SCHOOL 
 
11/1/2017 9:22 AM 
 
Mr. Johnathan Lara [address] [facility] [id number] Patricia Jason To Whom It May Concern: 
 
Bernardo Fenton was under the care of Cookie Garcia from 10/23/2017 to present. He will be able to return to work/school on 11/3/2017 with no restrictions. If there are questions or concerns please have the patient contact our office. Sincerely, Janina Burton MD

## 2017-11-01 NOTE — PROGRESS NOTES
1. Have you been to the ER, urgent care clinic since your last visit? Hospitalized since your last visit? No    2. Have you seen or consulted any other health care providers outside of the [address] [place] [address] since your last visit? Include any pap smears or colon screening.  No

## 2017-11-01 NOTE — MR AVS SNAPSHOT
Visit Information Date & Time Provider Department Dept. Phone Encounter #  
 11/1/2017  9:20 AM Chuyita Cunningham, [address] [phone] [id number] Upcoming Health Maintenance Date Due Pneumococcal 19-64 Medium Risk (1 of 1 - PPSV23) 11/8/1986 HEMOGLOBIN A1C Q6M 9/2/2015 FOOT EXAM Q1 12/1/2015 EYE EXAM RETINAL OR DILATED Q1 12/1/2015 MICROALBUMIN Q1 1/15/2016 LIPID PANEL Q1 2/16/2017 INFLUENZA AGE 9 TO ADULT 8/1/2017 DTaP/Tdap/Td series (2 - Td) 11/18/2024 Allergies as of 11/1/2017  Review Complete On: 11/1/2017 By: Chuyita Cunningham MD  
 No Known Allergies Current Immunizations  Reviewed on 11/18/2014 Name Date Influenza Vaccine (Quad) PF 11/18/2014 MMR 11/18/2014 TB Skin Test (PPD) Intradermal 11/18/2014 Tdap 11/18/2014 Not reviewed this visit You Were Diagnosed With   
  
 Codes Comments Foreign body in right foot, subsequent encounter    -  Primary ICD-10-CM: A13.017W ICD-9-CM: V58.89 Vitals BP Pulse Temp Resp Height(growth percentile) Weight(growth percentile) 120/70 (BP 1 Location: Left arm, BP Patient Position: Sitting) 88 98.1 °F (36.7 °C) (Oral) 16 5' 6\" (1.676 m) 194 lb (88 kg) SpO2 BMI Smoking Status 98% 31.31 kg/m2 Never Smoker BMI and BSA Data Body Mass Index Body Surface Area  
 31.31 kg/m 2 2.02 m 2 Preferred Pharmacy Pharmacy Name Phone [facility], [address] [place] [address] [phone] Your Updated Medication List  
  
   
This list is accurate as of: 11/1/17  9:24 AM.  Always use your most recent med list.  
  
  
  
  
 aspirin delayed-release 81 mg tablet Take 1 tablet by mouth daily. Blood-Glucose Meter monitoring kit Use to measure blood sugar 2 time daily and as needed. CYANOCOBALAMIN (VITAMIN B-12) PO Take  by mouth. glipiZIDE 5 mg tablet Commonly known as:  Lidna Landryving Take  by mouth two (2) times a day. glucose blood VI test strips strip Commonly known as:  ASCENSIA AUTODISC VI, ONE TOUCH ULTRA TEST VI  
Use to measure blood sugar 2 times daily and as needed Lancets Misc Use to measure blood sugar 2 times daily and as needed LYRICA 75 mg capsule Generic drug:  pregabalin Take  by mouth.  
  
 metFORMIN 850 mg tablet Commonly known as:  GLUCOPHAGE Take 1 Tab by mouth two (2) times daily (with meals). Omeprazole delayed release 20 mg tablet Commonly known as:  PRILOSEC D/R Take 1 tablet by mouth daily. As needed for hiccups or heartburn  
  
 rifAMPin 300 mg capsule Commonly known as:  RIFADIN Take 2 Caps by mouth Daily (before breakfast). sildenafil citrate 25 mg tablet Commonly known as:  VIAGRA Take 1 tablet by mouth as needed. To-Do List   
 12/27/2017 9:30 PM  
  Appointment with BEDROOM 1 [facility] [place] [facility] ([phone]) 1. Do not take a nap the day of the study  2. No caffeine after 12 noon the day of the study  3. Bring a 2 piece sleeping garment  4. Hair should be clean and dry, no oils, sprays, powders and remove wigs, weaves or other hair accessories  6. Patient should eat dinner prior to arriving for the test and a light breakfast will be provided upon discharge in the morning  7. Patient encouraged to bring activity items such as books, magazines, laptop, IPAD, etc, as well as toiletry items needed for the next morning  8. Bring all medications with you to the center  9. For specific questions please contact the sleep center directly, [phone] to [phone]  10. Do not arrive more than 15 minutes prior to appt time and use the doorbell to enter the sleep center. Introducing [facility] HE[facility] SERVICES! Dear Alonso Winter: Thank you for requesting a [employer] account.   Our records indicate that you already have an active Admittor account. You can access your account anytime at [email or web address]. [email or web address] Did you know that you can access your hospital and ER discharge instructions at any time in Admittor? You can also review all of your test results from your hospital stay or ER visit. Additional Information If you have questions, please visit the Frequently Asked Questions section of the Admittor website at [email or web address] Remember, Admittor is NOT to be used for urgent needs. For medical emergencies, dial 911. Now available from your iPhone and Android! Please provide this summary of care documentation to your next provider. Your primary care clinician is listed as Michell Shah. If you have any questions after today's visit, please call [phone].

## 2017-12-27 ENCOUNTER — HOSPITAL ENCOUNTER (OUTPATIENT)
Dept: SLEEP MEDICINE | Age: 52
Discharge: HOME | End: 2017-12-27
Payer: SUBSIDIZED

## 2017-12-27 VITALS
HEIGHT: 66 IN | DIASTOLIC BLOOD PRESSURE: 83 MMHG | WEIGHT: 194.5 LBS | SYSTOLIC BLOOD PRESSURE: 147 MMHG | TEMPERATURE: 97.3 F | HEART RATE: 94 BPM | BODY MASS INDEX: 31.26 KG/M2 | OXYGEN SATURATION: 97 %

## 2017-12-27 DIAGNOSIS — G47.33 OSA (OBSTRUCTIVE SLEEP APNEA): ICD-10-CM

## 2017-12-27 PROCEDURE — 95811 POLYSOM 6/>YRS CPAP 4/> PARM: CPT | Performed by: INTERNAL MEDICINE

## 2017-12-28 ENCOUNTER — TELEPHONE (OUTPATIENT)
Dept: SLEEP MEDICINE | Age: 52
End: 2017-12-28

## 2017-12-28 DIAGNOSIS — G47.33 OSA (OBSTRUCTIVE SLEEP APNEA): Primary | ICD-10-CM

## 2017-12-29 NOTE — TELEPHONE ENCOUNTER
Orders Placed This Encounter    AMB SUPPLY ORDER     Diagnosis: Sleep Apnea ICD-10 Code (G47.30); ICD-9 Code (780.57). Positive Airway Pressure Therapy: Duration of need: 99 months. ResMed VPAP S (Spontaneous Mode):  IPAP: 12 cmH2O; Minimum EPAP: 6 cmH2O. Ramp Time: 30 Minutes; Easy Breathe: On.    CPAP mask -  As fitted during titration OR patient preference, headgear, tubing, and filter;  heated humidifier; wireless modem. Remote monitoring enrollment. Jim Marvin MD, FAASM; NPI: [id number]  Electronically signed.  12/29/17

## 2018-02-10 ENCOUNTER — HOSPITAL ENCOUNTER (EMERGENCY)
Age: 53
Discharge: HOME | End: 2018-02-10
Attending: EMERGENCY MEDICINE
Payer: SUBSIDIZED

## 2018-02-10 ENCOUNTER — APPOINTMENT (OUTPATIENT)
Dept: CT IMAGING | Age: 53
End: 2018-02-10
Attending: EMERGENCY MEDICINE
Payer: SUBSIDIZED

## 2018-02-10 VITALS
WEIGHT: 191.6 LBS | DIASTOLIC BLOOD PRESSURE: 70 MMHG | RESPIRATION RATE: 15 BRPM | BODY MASS INDEX: 26.82 KG/M2 | SYSTOLIC BLOOD PRESSURE: 110 MMHG | HEART RATE: 90 BPM | TEMPERATURE: 98.1 F | HEIGHT: 71 IN | OXYGEN SATURATION: 96 %

## 2018-02-10 DIAGNOSIS — R51.9 ACUTE NONINTRACTABLE HEADACHE, UNSPECIFIED HEADACHE TYPE: ICD-10-CM

## 2018-02-10 DIAGNOSIS — R19.7 DIARRHEA, UNSPECIFIED TYPE: ICD-10-CM

## 2018-02-10 DIAGNOSIS — R11.2 NON-INTRACTABLE VOMITING WITH NAUSEA, UNSPECIFIED VOMITING TYPE: Primary | ICD-10-CM

## 2018-02-10 LAB
ALBUMIN SERPL-MCNC: 3.6 G/DL (ref 3.5–5)
ALBUMIN/GLOB SERPL: 0.8 {RATIO} (ref 1.1–2.2)
ALP SERPL-CCNC: 54 U/L (ref 45–117)
ALT SERPL-CCNC: 59 U/L (ref 12–78)
ANION GAP SERPL CALC-SCNC: 9 MMOL/L (ref 5–15)
APPEARANCE UR: CLEAR
AST SERPL-CCNC: 42 U/L (ref 15–37)
BACTERIA URNS QL MICRO: NEGATIVE /HPF
BASOPHILS # BLD: 0 K/UL (ref 0–0.1)
BASOPHILS NFR BLD: 0 % (ref 0–1)
BILIRUB SERPL-MCNC: 1.4 MG/DL (ref 0.2–1)
BILIRUB UR QL: NEGATIVE
BUN SERPL-MCNC: 18 MG/DL (ref 6–20)
BUN/CREAT SERPL: 15 (ref 12–20)
CALCIUM SERPL-MCNC: 8.7 MG/DL (ref 8.5–10.1)
CHLORIDE SERPL-SCNC: 101 MMOL/L (ref 97–108)
CO2 SERPL-SCNC: 24 MMOL/L (ref 21–32)
COLOR UR: ABNORMAL
CREAT SERPL-MCNC: 1.2 MG/DL (ref 0.7–1.3)
DIFFERENTIAL METHOD BLD: ABNORMAL
EOSINOPHIL # BLD: 0 K/UL (ref 0–0.4)
EOSINOPHIL NFR BLD: 0 % (ref 0–7)
EPITH CASTS URNS QL MICRO: ABNORMAL /LPF
ERYTHROCYTE [DISTWIDTH] IN BLOOD BY AUTOMATED COUNT: 13.2 % (ref 11.5–14.5)
GLOBULIN SER CALC-MCNC: 4.5 G/DL (ref 2–4)
GLUCOSE SERPL-MCNC: 197 MG/DL (ref 65–100)
GLUCOSE UR STRIP.AUTO-MCNC: NEGATIVE MG/DL
HCT VFR BLD AUTO: 46.4 % (ref 36.6–50.3)
HGB BLD-MCNC: 16.4 G/DL (ref 12.1–17)
HGB UR QL STRIP: NEGATIVE
HYALINE CASTS URNS QL MICRO: ABNORMAL /LPF (ref 0–5)
IMM GRANULOCYTES # BLD: 0 K/UL (ref 0–0.04)
IMM GRANULOCYTES NFR BLD AUTO: 1 % (ref 0–0.5)
KETONES UR QL STRIP.AUTO: NEGATIVE MG/DL
LEUKOCYTE ESTERASE UR QL STRIP.AUTO: NEGATIVE
LIPASE SERPL-CCNC: 126 U/L (ref 73–393)
LYMPHOCYTES # BLD: 1.2 K/UL (ref 0.8–3.5)
LYMPHOCYTES NFR BLD: 20 % (ref 12–49)
MCH RBC QN AUTO: 30.5 PG (ref 26–34)
MCHC RBC AUTO-ENTMCNC: 35.3 G/DL (ref 30–36.5)
MCV RBC AUTO: 86.4 FL (ref 80–99)
MONOCYTES # BLD: 0.6 K/UL (ref 0–1)
MONOCYTES NFR BLD: 11 % (ref 5–13)
NEUTS SEG # BLD: 4 K/UL (ref 1.8–8)
NEUTS SEG NFR BLD: 68 % (ref 32–75)
NITRITE UR QL STRIP.AUTO: NEGATIVE
NRBC # BLD: 0 K/UL (ref 0–0.01)
NRBC BLD-RTO: 0 PER 100 WBC
PH UR STRIP: 5.5 [PH] (ref 5–8)
PLATELET # BLD AUTO: 179 K/UL (ref 150–400)
PMV BLD AUTO: 12 FL (ref 8.9–12.9)
POTASSIUM SERPL-SCNC: 3.9 MMOL/L (ref 3.5–5.1)
PROT SERPL-MCNC: 8.1 G/DL (ref 6.4–8.2)
PROT UR STRIP-MCNC: ABNORMAL MG/DL
RBC # BLD AUTO: 5.37 M/UL (ref 4.1–5.7)
RBC #/AREA URNS HPF: ABNORMAL /HPF (ref 0–5)
SODIUM SERPL-SCNC: 134 MMOL/L (ref 136–145)
SP GR UR REFRACTOMETRY: 1.03 (ref 1–1.03)
UROBILINOGEN UR QL STRIP.AUTO: 0.2 EU/DL (ref 0.2–1)
WBC # BLD AUTO: 5.8 K/UL (ref 4.1–11.1)
WBC URNS QL MICRO: ABNORMAL /HPF (ref 0–4)

## 2018-02-10 PROCEDURE — 96361 HYDRATE IV INFUSION ADD-ON: CPT

## 2018-02-10 PROCEDURE — 70450 CT HEAD/BRAIN W/O DYE: CPT

## 2018-02-10 PROCEDURE — 85025 COMPLETE CBC W/AUTO DIFF WBC: CPT | Performed by: EMERGENCY MEDICINE

## 2018-02-10 PROCEDURE — 96374 THER/PROPH/DIAG INJ IV PUSH: CPT

## 2018-02-10 PROCEDURE — 36415 COLL VENOUS BLD VENIPUNCTURE: CPT | Performed by: EMERGENCY MEDICINE

## 2018-02-10 PROCEDURE — 83690 ASSAY OF LIPASE: CPT | Performed by: EMERGENCY MEDICINE

## 2018-02-10 PROCEDURE — 80053 COMPREHEN METABOLIC PANEL: CPT | Performed by: EMERGENCY MEDICINE

## 2018-02-10 PROCEDURE — 74011250636 HC RX REV CODE- 250/636: Performed by: EMERGENCY MEDICINE

## 2018-02-10 PROCEDURE — 99284 EMERGENCY DEPT VISIT MOD MDM: CPT

## 2018-02-10 PROCEDURE — 81001 URINALYSIS AUTO W/SCOPE: CPT | Performed by: EMERGENCY MEDICINE

## 2018-02-10 RX ORDER — ONDANSETRON 2 MG/ML
8 INJECTION INTRAMUSCULAR; INTRAVENOUS
Status: COMPLETED | OUTPATIENT
Start: 2018-02-10 | End: 2018-02-10

## 2018-02-10 RX ORDER — ONDANSETRON 8 MG/1
8 TABLET, ORALLY DISINTEGRATING ORAL
Qty: 12 TAB | Refills: 0 | Status: SHIPPED | OUTPATIENT
Start: 2018-02-10 | End: 2018-02-17

## 2018-02-10 RX ADMIN — ONDANSETRON 8 MG: 2 INJECTION INTRAMUSCULAR; INTRAVENOUS at 01:53

## 2018-02-10 RX ADMIN — SODIUM CHLORIDE 1000 ML: 900 INJECTION, SOLUTION INTRAVENOUS at 01:57

## 2018-02-10 NOTE — ED NOTES
Discharge instructions and written script given to pt by provider with opportunity to ask questions. Pt verbalized understanding. VSS. NAD noted. Pt ambulatory with wife out of ED.

## 2018-02-10 NOTE — ED PROVIDER NOTES
HPI Comments: 50 y.o. male with past medical history significant for HTN, DM, GERD who presents from home driven by spouse for evaluation of multiple symptoms. Pt reports 1 day hx of nausea and vomiting (\"many times\") accompanied by fever, generalized abdominal pain (5/10), diarrhea lightheadedness and severe headache. Pt states that nausea and vomiting began prior to headache. Pt denies significant hx of headaches. He notes known sick contact with a relative with similar symptoms (N/V/D) who was evaluated in ED 1 day ago and discharged home. Pt denies chest pain, urinary symptoms, weakness or numbness. No abdominal surgery. There are no other acute medical concerns at this time. PCP: Ana Luisa Salomon MD    Note written by Castro Quintero, as dictated by Carolin Morales MD 1:40 AM    The history is provided by the patient. No  was used. Past Medical History:   Diagnosis Date    Diabetes ([facility] [address]     Esophageal reflux     reported by patient has had 2 endoscopies in [facility] liver     reported by patient seen on u/s in [facility] 258 body in foot, right 10/23/2017    Hypertension     Sleep apnea, obstructive 4/2014    AHI 32.3, does not have machine       History reviewed. No pertinent surgical history. Family History:   Problem Relation Age of Onset    Heart Disease Father        Social History     Social History    Marital status:      Spouse name: N/A    Number of children: N/A    Years of education: N/A     Occupational History    Not on file. Social History Main Topics    Smoking status: Never Smoker    Smokeless tobacco: Never Used    Alcohol use No    Drug use: No    Sexual activity: Yes     Partners: Female      Comment:      Other Topics Concern    Not on file     Social History Narrative         ALLERGIES: Review of patient's allergies indicates no known allergies.     Review of Systems   Constitutional: Positive for fever.   HENT: Negative for congestion. Respiratory: Negative for cough and shortness of breath. Cardiovascular: Negative for chest pain. Gastrointestinal: Positive for abdominal pain (generalized), diarrhea, nausea and vomiting. Negative for constipation. Musculoskeletal: Negative for back pain and neck pain. Neurological: Positive for headaches. Negative for weakness and numbness. Vitals:    02/10/18 0057 02/10/18 0132   BP: 132/82    Pulse: (!) 114    Resp: 18    Temp: 100 °F (37.8 °C) 99.4 °F (37.4 °C)   SpO2: 96%    Weight: 86.9 kg (191 lb 9.6 oz)    Height: 5' 10.87\" (1.8 m)             Physical Exam   Constitutional: He is oriented to person, place, and time. He appears well-developed and well-nourished. HENT:   Head: Normocephalic and atraumatic. Mouth/Throat: Oropharynx is clear and moist.   Eyes: Conjunctivae are normal.   Neck: Normal range of motion. Neck supple. Cardiovascular: Normal rate, regular rhythm and normal heart sounds. Pulmonary/Chest: Effort normal and breath sounds normal.   Abdominal: Soft. Bowel sounds are normal. There is no tenderness. Musculoskeletal: Normal range of motion. He exhibits no edema or tenderness. Neurological: He is alert and oriented to person, place, and time. No cranial nerve deficit. He exhibits normal muscle tone. Coordination normal.   Skin: Skin is warm and dry. Psychiatric: He has a normal mood and affect. His behavior is normal.   Nursing note and vitals reviewed. Note written by Castro Brown, as dictated by Harrison Colbert MD 1:56 AM    [facility]      ED Course       Procedures    PROGRESS NOTE:  2:56 AM  Pt feeling better. He is performing a PO challenge. 3:06 AM  Pt completed PO challenge. His headache has also improved. Pt to be discharged home. A/P:  1. Vomiting/Diarrhea - likely viral illness. No vomiting in ED. Tolerated po. Zofran prn.  2. Abdominal pain - resolved.   3. Headache - resolved. Patient's results have been reviewed with them. Patient and/or family have verbally conveyed their understanding and agreement of the patient's signs, symptoms, diagnosis, treatment and prognosis and additionally agree to follow up as recommended or return to the Emergency Room should their condition change prior to follow-up. Discharge instructions have also been provided to the patient with some educational information regarding their diagnosis as well a list of reasons why they would want to return to the ER prior to their follow-up appointment should their condition change.

## 2018-02-10 NOTE — LETTER
[address] 
[address] [address] 
[phone] Work/School Note Date: 2/10/2018 To Whom It May concern: 
 
Toña Garcia was seen and treated today in the emergency room by the following provider(s): 
Attending Provider: Fly Callahan MD. Uche Wakefield may return to work on Tuesday, 2/13/18.  
 
Sincerely, 
 
 
 
 
Fly Callahan MD

## 2018-02-10 NOTE — DISCHARGE INSTRUCTIONS
Headache: Care Instructions  Your Care Instructions    Headaches have many possible causes. Most headaches aren't a sign of a more serious problem, and they will get better on their own. Home treatment may help you feel better faster. The doctor has checked you carefully, but problems can develop later. If you notice any problems or new symptoms, get medical treatment right away. Follow-up care is a key part of your treatment and safety. Be sure to make and go to all appointments, and call your doctor if you are having problems. It's also a good idea to know your test results and keep a list of the medicines you take. How can you care for yourself at home? · Do not drive if you have taken a prescription pain medicine. · Rest in a quiet, dark room until your headache is gone. Close your eyes and try to relax or go to sleep. Don't watch TV or read. · Put a cold, moist cloth or cold pack on the painful area for 10 to 20 minutes at a time. Put a thin cloth between the cold pack and your skin. · Use a warm, moist towel or a heating pad set on low to relax tight shoulder and neck muscles. · Have someone gently massage your neck and shoulders. · Take pain medicines exactly as directed. ¨ If the doctor gave you a prescription medicine for pain, take it as prescribed. ¨ If you are not taking a prescription pain medicine, ask your doctor if you can take an over-the-counter medicine. · Be careful not to take pain medicine more often than the instructions allow, because you may get worse or more frequent headaches when the medicine wears off. · Do not ignore new symptoms that occur with a headache, such as a fever, weakness or numbness, vision changes, or confusion. These may be signs of a more serious problem. To prevent headaches  · Keep a headache diary so you can figure out what triggers your headaches. Avoiding triggers may help you prevent headaches.  Record when each headache began, how long it lasted, and what the pain was like (throbbing, aching, stabbing, or dull). Write down any other symptoms you had with the headache, such as nausea, flashing lights or dark spots, or sensitivity to bright light or loud noise. Note if the headache occurred near your period. List anything that might have triggered the headache, such as certain foods (chocolate, cheese, wine) or odors, smoke, bright light, stress, or lack of sleep. · Find healthy ways to deal with stress. Headaches are most common during or right after stressful times. Take time to relax before and after you do something that has caused a headache in the past.  · Try to keep your muscles relaxed by keeping good posture. Check your jaw, face, neck, and shoulder muscles for tension, and try relaxing them. When sitting at a desk, change positions often, and stretch for 30 seconds each hour. · Get plenty of sleep and exercise. · Eat regularly and well. Long periods without food can trigger a headache. · Treat yourself to a massage. Some people find that regular massages are very helpful in relieving tension. · Limit caffeine by not drinking too much coffee, tea, or soda. But don't quit caffeine suddenly, because that can also give you headaches. · Reduce eyestrain from computers by blinking frequently and looking away from the computer screen every so often. Make sure you have proper eyewear and that your monitor is set up properly, about an arm's length away. · Seek help if you have depression or anxiety. Your headaches may be linked to these conditions. Treatment can both prevent headaches and help with symptoms of anxiety or depression. When should you call for help? Call 911 anytime you think you may need emergency care. For example, call if:  ? · You have signs of a stroke. These may include:  ¨ Sudden numbness, paralysis, or weakness in your face, arm, or leg, especially on only one side of your body. ¨ Sudden vision changes.   ¨ Sudden trouble speaking. ¨ Sudden confusion or trouble understanding simple statements. ¨ Sudden problems with walking or balance. ¨ A sudden, severe headache that is different from past headaches. ?Call your doctor now or seek immediate medical care if:  ? · You have a new or worse headache. ? · Your headache gets much worse. Where can you learn more? Go to http://eren-jennifer.info/. Enter M271 in the search box to learn more about \"Headache: Care Instructions. \"  Current as of: October 14, 2016  Content Version: 11.4  © 4041-2447 [employer]. Care instructions adapted under license by [employer] (which disclaims liability or warranty for this information). If you have questions about a medical condition or this instruction, always ask your healthcare professional. [address] any warranty or liability for your use of this information. Diarrhea: Care Instructions  Your Care Instructions    Diarrhea is loose, watery stools (bowel movements). The exact cause is often hard to find. Sometimes diarrhea is your body's way of getting rid of what caused an upset stomach. Viruses, food poisoning, and many medicines can cause diarrhea. Some people get diarrhea in response to emotional stress, anxiety, or certain foods. Almost everyone has diarrhea now and then. It usually isn't serious, and your stools will return to normal soon. The important thing to do is replace the fluids you have lost, so you can prevent dehydration. The doctor has checked you carefully, but problems can develop later. If you notice any problems or new symptoms, get medical treatment right away. Follow-up care is a key part of your treatment and safety. Be sure to make and go to all appointments, and call your doctor if you are having problems. It's also a good idea to know your test results and keep a list of the medicines you take.   How can you care for yourself at home?  · Watch for signs of dehydration, which means your body has lost too much water. Dehydration is a serious condition and should be treated right away. Signs of dehydration are:  ¨ Increasing thirst and dry eyes and mouth. ¨ Feeling faint or lightheaded. ¨ Darker urine, and a smaller amount of urine than normal.  · To prevent dehydration, drink plenty of fluids, enough so that your urine is light yellow or clear like water. Choose water and other caffeine-free clear liquids until you feel better. If you have kidney, heart, or liver disease and have to limit fluids, talk with your doctor before you increase the amount of fluids you drink. · Begin eating small amounts of mild foods the next day, if you feel like it. ¨ Try yogurt that has live cultures of Lactobacillus. (Check the label.)  ¨ Avoid spicy foods, fruits, alcohol, and caffeine until 48 hours after all symptoms are gone. ¨ Avoid chewing gum that contains sorbitol. ¨ Avoid dairy products (except for yogurt with Lactobacillus) while you have diarrhea and for 3 days after symptoms are gone. · The doctor may recommend that you take over-the-counter medicine, such as loperamide (Imodium), if you still have diarrhea after 6 hours. Read and follow all instructions on the label. Do not use this medicine if you have bloody diarrhea, a high fever, or other signs of serious illness. Call your doctor if you think you are having a problem with your medicine. When should you call for help? Call 911 anytime you think you may need emergency care. For example, call if:  ? · You passed out (lost consciousness). ? · Your stools are maroon or very bloody. ?Call your doctor now or seek immediate medical care if:  ? · You are dizzy or lightheaded, or you feel like you may faint. ? · Your stools are black and look like tar, or they have streaks of blood. ? · You have new or worse belly pain.    ? · You have symptoms of dehydration, such as:  ¨ Dry eyes and a dry mouth. ¨ Passing only a little dark urine. ¨ Feeling thirstier than usual.   ? · You have a new or higher fever. ? Watch closely for changes in your health, and be sure to contact your doctor if:  ? · Your diarrhea is getting worse. ? · You see pus in the diarrhea. ? · You are not getting better after 2 days (48 hours). Where can you learn more? Go to http://eren-jennifer.info/. Enter H583 in the search box to learn more about \"Diarrhea: Care Instructions. \"  Current as of: March 20, 2017  Content Version: 11.4  © 1334-6017 [employer]. Care instructions adapted under license by [employer] (which disclaims liability or warranty for this information). If you have questions about a medical condition or this instruction, always ask your healthcare professional. [address] any warranty or liability for your use of this information. Nausea and Vomiting: Care Instructions  Your Care Instructions    When you are nauseated, you may feel weak and sweaty and notice a lot of saliva in your mouth. Nausea often leads to vomiting. Most of the time you do not need to worry about nausea and vomiting, but they can be signs of other illnesses. Two common causes of nausea and vomiting are stomach flu and food poisoning. Nausea and vomiting from viral stomach flu will usually start to improve within 24 hours. Nausea and vomiting from food poisoning may last from 12 to 48 hours. The doctor has checked you carefully, but problems can develop later. If you notice any problems or new symptoms, get medical treatment right away. Follow-up care is a key part of your treatment and safety. Be sure to make and go to all appointments, and call your doctor if you are having problems. It's also a good idea to know your test results and keep a list of the medicines you take. How can you care for yourself at home?   · To prevent dehydration, drink plenty of fluids, enough so that your urine is light yellow or clear like water. Choose water and other caffeine-free clear liquids until you feel better. If you have kidney, heart, or liver disease and have to limit fluids, talk with your doctor before you increase the amount of fluids you drink. · Rest in bed until you feel better. · When you are able to eat, try clear soups, mild foods, and liquids until all symptoms are gone for 12 to 48 hours. Other good choices include dry toast, crackers, cooked cereal, and gelatin dessert, such as Jell-O. When should you call for help? Call 911 anytime you think you may need emergency care. For example, call if:  ? · You passed out (lost consciousness). ?Call your doctor now or seek immediate medical care if:  ? · You have symptoms of dehydration, such as:  ¨ Dry eyes and a dry mouth. ¨ Passing only a little dark urine. ¨ Feeling thirstier than usual.   ? · You have new or worsening belly pain. ? · You have a new or higher fever. ? · You vomit blood or what looks like coffee grounds. ? Watch closely for changes in your health, and be sure to contact your doctor if:  ? · You have ongoing nausea and vomiting. ? · Your vomiting is getting worse. ? · Your vomiting lasts longer than 2 days. ? · You are not getting better as expected. Where can you learn more? Go to http://eren-jennifer.info/. Enter 25 [id number] in the search box to learn more about \"Nausea and Vomiting: Care Instructions. \"  Current as of: March 20, 2017  Content Version: 11.4  © 6000-69[address]. Care instructions adapted under license by [employer] (which disclaims liability or warranty for this information). If you have questions about a medical condition or this instruction, always ask your healthcare professional. [address] any warranty or liability for your use of this information.

## 2018-02-10 NOTE — ED TRIAGE NOTES
Pt to ED for c/o N/V/D, abdominal pain and headache that began yesterday morning. Also reports intermittent chills. Pt reports not being able to keep food or drink down since yesterday. Pt feels warm to the touch, oral temp 100, pulse 114.

## 2018-03-09 ENCOUNTER — DOCUMENTATION ONLY (OUTPATIENT)
Dept: SLEEP MEDICINE | Age: 53
End: 2018-03-09

## 2018-03-09 NOTE — PROGRESS NOTES
Patient would like to proceed with getting a bilevel from the [employer] Assistance Program. He will be coming to the office at 11:30 am to  completed form to take home and mail in. Please complete and give to Dr. Racheal Severe for his signature so we are ready for him Wednesday. Thank you.

## 2018-04-13 ENCOUNTER — OFFICE VISIT (OUTPATIENT)
Dept: SLEEP MEDICINE | Age: 53
End: 2018-04-13

## 2018-04-13 VITALS
WEIGHT: 192 LBS | DIASTOLIC BLOOD PRESSURE: 74 MMHG | HEART RATE: 84 BPM | BODY MASS INDEX: 27.49 KG/M2 | OXYGEN SATURATION: 98 % | HEIGHT: 70 IN | SYSTOLIC BLOOD PRESSURE: 116 MMHG

## 2018-04-13 DIAGNOSIS — G47.33 OBSTRUCTIVE SLEEP APNEA: Primary | ICD-10-CM

## 2018-08-05 ENCOUNTER — HOSPITAL ENCOUNTER (EMERGENCY)
Age: 53
Discharge: HOME | End: 2018-08-06
Attending: EMERGENCY MEDICINE
Payer: SUBSIDIZED

## 2018-08-05 ENCOUNTER — APPOINTMENT (OUTPATIENT)
Dept: CT IMAGING | Age: 53
End: 2018-08-05
Attending: PHYSICIAN ASSISTANT
Payer: SUBSIDIZED

## 2018-08-05 DIAGNOSIS — M54.2 NECK PAIN: Primary | ICD-10-CM

## 2018-08-05 PROCEDURE — 74011250636 HC RX REV CODE- 250/636: Performed by: PHYSICIAN ASSISTANT

## 2018-08-05 PROCEDURE — 72125 CT NECK SPINE W/O DYE: CPT

## 2018-08-05 PROCEDURE — 96372 THER/PROPH/DIAG INJ SC/IM: CPT

## 2018-08-05 PROCEDURE — 99282 EMERGENCY DEPT VISIT SF MDM: CPT

## 2018-08-05 RX ORDER — KETOROLAC TROMETHAMINE 30 MG/ML
30 INJECTION, SOLUTION INTRAMUSCULAR; INTRAVENOUS
Status: COMPLETED | OUTPATIENT
Start: 2018-08-05 | End: 2018-08-05

## 2018-08-05 RX ADMIN — KETOROLAC TROMETHAMINE 30 MG: 30 INJECTION, SOLUTION INTRAMUSCULAR at 23:01

## 2018-08-06 VITALS
RESPIRATION RATE: 16 BRPM | SYSTOLIC BLOOD PRESSURE: 128 MMHG | TEMPERATURE: 98 F | HEART RATE: 80 BPM | DIASTOLIC BLOOD PRESSURE: 78 MMHG | OXYGEN SATURATION: 97 %

## 2018-08-06 RX ORDER — METHOCARBAMOL 500 MG/1
500 TABLET, FILM COATED ORAL 4 TIMES DAILY
Qty: 20 TAB | Refills: 0 | Status: SHIPPED | OUTPATIENT
Start: 2018-08-06 | End: 2019-04-29

## 2018-08-06 RX ORDER — NAPROXEN 500 MG/1
500 TABLET ORAL
Qty: 20 TAB | Refills: 0 | Status: SHIPPED | OUTPATIENT
Start: 2018-08-06 | End: 2019-04-29

## 2018-08-06 NOTE — ED PROVIDER NOTES
HPI Comments: 51 yo M with hx of HTN, DM, sleep apnea, fatty liver, and reflux here for evaluation of neck pain. States he has been having pain over the past 3 weeks. Staes pain to sides of neck and are worse with movement and rotation of neck. No numbness/tingling. States he has noticed it worse in past few days. No radiation of pain. Denies cough, CP, SOB, abd pain, flank pain, urinary symptoms. Non smoker. Patient is a 50 y.o. male presenting with neck pain. The history is provided by the patient. Neck Pain    This is a recurrent problem. The current episode started more than 2 days ago. The problem occurs constantly. The pain is associated with twisting. There has been no fever. The quality of the pain is described as aching. The pain is at a severity of 7/10. The pain is mild. The symptoms are aggravated by certain positions and twisting. Pertinent negatives include no photophobia, no visual change, no chest pain, no syncope, no numbness, no weight loss, no headaches, no bowel incontinence, no bladder incontinence, no leg pain, no paresis, no tingling and no weakness. He has tried nothing for the symptoms. Past Medical History:   Diagnosis Date    Diabetes ([facility] [address]     Esophageal reflux     reported by patient has had 2 endoscopies in [facility] liver     reported by patient seen on u/s in [facility] 258 body in foot, right 10/23/2017    Hypertension     Sleep apnea, obstructive 4/2014    AHI 32.3, does not have machine       No past surgical history on file. Family History:   Problem Relation Age of Onset    Heart Disease Father        Social History     Social History    Marital status:      Spouse name: N/A    Number of children: N/A    Years of education: N/A     Occupational History    Not on file.      Social History Main Topics    Smoking status: Never Smoker    Smokeless tobacco: Never Used    Alcohol use No    Drug use: No    Sexual activity: Yes Partners: Female      Comment:      Other Topics Concern    Not on file     Social History Narrative         ALLERGIES: Review of patient's allergies indicates no known allergies. Review of Systems   Constitutional: Negative for weight loss. Eyes: Negative for photophobia. Cardiovascular: Negative for chest pain and syncope. Gastrointestinal: Negative for bowel incontinence. Genitourinary: Negative for bladder incontinence. Musculoskeletal: Positive for myalgias and neck pain. Neurological: Negative for tingling, weakness, numbness and headaches. Vitals:    08/05/18 2150   BP: (!) 144/91   Pulse: 90   Resp: 18   Temp: 98 °F (36.7 °C)            Physical Exam   Constitutional: He is oriented to person, place, and time. He appears well-developed and well-nourished. HENT:   Head: Normocephalic and atraumatic. Right Ear: External ear normal.   Left Ear: External ear normal.   Nose: Nose normal.   Mouth/Throat: Oropharynx is clear and moist.   Eyes: Conjunctivae and EOM are normal. Pupils are equal, round, and reactive to light. Right eye exhibits no discharge. Left eye exhibits no discharge. Neck: Normal range of motion. Neck supple. Cardiovascular: Normal rate, regular rhythm, normal heart sounds and intact distal pulses. Pulmonary/Chest: Effort normal and breath sounds normal.   Abdominal: Soft. Bowel sounds are normal. He exhibits no distension. There is no tenderness. There is no rebound and no guarding. Musculoskeletal: Normal range of motion. He exhibits tenderness. He exhibits no edema. Cervical back: He exhibits tenderness (bilateral sternocleidomastoid). He exhibits normal range of motion, no bony tenderness, no swelling and no edema. Neurological: He is alert and oriented to person, place, and time. He has normal strength. He is not disoriented. No cranial nerve deficit or sensory deficit. Coordination and gait normal. GCS eye subscore is 4.  GCS verbal subscore is 5. GCS motor subscore is 6. Skin: Skin is warm and dry. No rash noted. Psychiatric: He has a normal mood and affect. His behavior is normal. Judgment and thought content normal.   Nursing note and vitals reviewed. MDM  Number of Diagnoses or Management Options  Neck pain:      Amount and/or Complexity of Data Reviewed  Tests in the radiology section of CPT®: ordered and reviewed  Discuss the patient with other providers: yes  Independent visualization of images, tracings, or specimens: yes          ED Course       Procedures    Patient has been reassessed. Feeling better. Reviewed labs, medications and radiographics with patient. Ready to discharge home. Discussed case with attending Physician. Agrees with care and will D/C with follow up.      12:37 AM  Patient's results have been reviewed with them. Patient and/or family have verbally conveyed their understanding and agreement of the patient's signs, symptoms, diagnosis, treatment and prognosis and additionally agree to follow up as recommended or return to the Emergency Room should their condition change prior to follow-up. Discharge instructions have also been provided to the patient with some educational information regarding their diagnosis as well a list of reasons why they would want to return to the ER prior to their follow-up appointment should their condition change.   AMELIA Damon

## 2018-08-06 NOTE — ED NOTES
Discharge instructions given and reviewed with pt. Pt. Verbalized understanding. Steady gait on discharge.

## 2018-08-06 NOTE — ED TRIAGE NOTES
TRIAGE NOTE:  Patient arrives with c/o neck pain x 3 weeks, and reports pain tonight is too hard for him to take. ROM intact. Patient reports pain starts in the back and radiates to both shoulders.

## 2018-08-06 NOTE — DISCHARGE INSTRUCTIONS
Neck Pain: Care Instructions  Your Care Instructions    You can have neck pain anywhere from the bottom of your head to the top of your shoulders. It can spread to the upper back or arms. Injuries, painting a ceiling, sleeping with your neck twisted, staying in one position for too long, and many other activities can cause neck pain. Most neck pain gets better with home care. Your doctor may recommend medicine to relieve pain or relax your muscles. He or she may suggest exercise and physical therapy to increase flexibility and relieve stress. You may need to wear a special (cervical) collar to support your neck for a day or two. Follow-up care is a key part of your treatment and safety. Be sure to make and go to all appointments, and call your doctor if you are having problems. It's also a good idea to know your test results and keep a list of the medicines you take. How can you care for yourself at home? · Try using a heating pad on a low or medium setting for 15 to 20 minutes every 2 or 3 hours. Try a warm shower in place of one session with the heating pad. · You can also try an ice pack for 10 to 15 minutes every 2 to 3 hours. Put a thin cloth between the ice and your skin. · Take pain medicines exactly as directed. ¨ If the doctor gave you a prescription medicine for pain, take it as prescribed. ¨ If you are not taking a prescription pain medicine, ask your doctor if you can take an over-the-counter medicine. · If your doctor recommends a cervical collar, wear it exactly as directed. When should you call for help? Call your doctor now or seek immediate medical care if:    · You have new or worsening numbness in your arms, buttocks or legs.     · You have new or worsening weakness in your arms or legs.  (This could make it hard to stand up.)     · You lose control of your bladder or bowels.    Watch closely for changes in your health, and be sure to contact your doctor if:    · Your neck pain is getting worse.     · You are not getting better after 1 week.     · You do not get better as expected. Where can you learn more? Go to http://eren-jennifer.info/. Enter [id number] in the search box to learn more about \"Neck Pain: Care Instructions. \"  Current as of: November 29, 2017  Content Version: 11.7  © 0033-1353 [employer]. Care instructions adapted under license by [employer] (which disclaims liability or warranty for this information). If you have questions about a medical condition or this instruction, always ask your healthcare professional. [place] [address] any warranty or liability for your use of this information.

## 2018-10-27 ENCOUNTER — APPOINTMENT (OUTPATIENT)
Dept: ULTRASOUND IMAGING | Age: 53
End: 2018-10-27
Attending: STUDENT IN AN ORGANIZED HEALTH CARE EDUCATION/TRAINING PROGRAM
Payer: SUBSIDIZED

## 2018-10-27 ENCOUNTER — HOSPITAL ENCOUNTER (EMERGENCY)
Age: 53
Discharge: HOME | End: 2018-10-27
Attending: STUDENT IN AN ORGANIZED HEALTH CARE EDUCATION/TRAINING PROGRAM
Payer: SUBSIDIZED

## 2018-10-27 VITALS
HEART RATE: 68 BPM | BODY MASS INDEX: 28.28 KG/M2 | DIASTOLIC BLOOD PRESSURE: 71 MMHG | WEIGHT: 197.53 LBS | RESPIRATION RATE: 16 BRPM | OXYGEN SATURATION: 98 % | SYSTOLIC BLOOD PRESSURE: 108 MMHG | HEIGHT: 70 IN | TEMPERATURE: 98.1 F

## 2018-10-27 DIAGNOSIS — R10.11 ABDOMINAL PAIN, RIGHT UPPER QUADRANT: Primary | ICD-10-CM

## 2018-10-27 LAB
ALBUMIN SERPL-MCNC: 3.5 G/DL (ref 3.5–5)
ALBUMIN/GLOB SERPL: 0.9 {RATIO} (ref 1.1–2.2)
ALP SERPL-CCNC: 67 U/L (ref 45–117)
ALT SERPL-CCNC: 33 U/L (ref 12–78)
ANION GAP SERPL CALC-SCNC: 9 MMOL/L (ref 5–15)
AST SERPL-CCNC: 17 U/L (ref 15–37)
BASOPHILS # BLD: 0 K/UL (ref 0–0.1)
BASOPHILS NFR BLD: 1 % (ref 0–1)
BILIRUB SERPL-MCNC: 0.8 MG/DL (ref 0.2–1)
BUN SERPL-MCNC: 18 MG/DL (ref 6–20)
BUN/CREAT SERPL: 19 (ref 12–20)
CALCIUM SERPL-MCNC: 8.6 MG/DL (ref 8.5–10.1)
CHLORIDE SERPL-SCNC: 104 MMOL/L (ref 97–108)
CO2 SERPL-SCNC: 25 MMOL/L (ref 21–32)
COMMENT, HOLDF: NORMAL
CREAT SERPL-MCNC: 0.96 MG/DL (ref 0.7–1.3)
DIFFERENTIAL METHOD BLD: ABNORMAL
EOSINOPHIL # BLD: 0.1 K/UL (ref 0–0.4)
EOSINOPHIL NFR BLD: 1 % (ref 0–7)
ERYTHROCYTE [DISTWIDTH] IN BLOOD BY AUTOMATED COUNT: 12.5 % (ref 11.5–14.5)
GLOBULIN SER CALC-MCNC: 4 G/DL (ref 2–4)
GLUCOSE SERPL-MCNC: 156 MG/DL (ref 65–100)
HCT VFR BLD AUTO: 45.8 % (ref 36.6–50.3)
HGB BLD-MCNC: 15.4 G/DL (ref 12.1–17)
IMM GRANULOCYTES # BLD: 0 K/UL (ref 0–0.04)
IMM GRANULOCYTES NFR BLD AUTO: 1 % (ref 0–0.5)
LIPASE SERPL-CCNC: 224 U/L (ref 73–393)
LYMPHOCYTES # BLD: 2 K/UL (ref 0.8–3.5)
LYMPHOCYTES NFR BLD: 46 % (ref 12–49)
MCH RBC QN AUTO: 29.5 PG (ref 26–34)
MCHC RBC AUTO-ENTMCNC: 33.6 G/DL (ref 30–36.5)
MCV RBC AUTO: 87.7 FL (ref 80–99)
MONOCYTES # BLD: 0.4 K/UL (ref 0–1)
MONOCYTES NFR BLD: 10 % (ref 5–13)
NEUTS SEG # BLD: 1.8 K/UL (ref 1.8–8)
NEUTS SEG NFR BLD: 42 % (ref 32–75)
NRBC # BLD: 0 K/UL (ref 0–0.01)
NRBC BLD-RTO: 0 PER 100 WBC
PLATELET # BLD AUTO: 179 K/UL (ref 150–400)
PMV BLD AUTO: 11.8 FL (ref 8.9–12.9)
POTASSIUM SERPL-SCNC: 3.9 MMOL/L (ref 3.5–5.1)
PROT SERPL-MCNC: 7.5 G/DL (ref 6.4–8.2)
RBC # BLD AUTO: 5.22 M/UL (ref 4.1–5.7)
SAMPLES BEING HELD,HOLD: NORMAL
SODIUM SERPL-SCNC: 138 MMOL/L (ref 136–145)
WBC # BLD AUTO: 4.3 K/UL (ref 4.1–11.1)

## 2018-10-27 PROCEDURE — 99282 EMERGENCY DEPT VISIT SF MDM: CPT

## 2018-10-27 PROCEDURE — 76705 ECHO EXAM OF ABDOMEN: CPT

## 2018-10-27 PROCEDURE — 36415 COLL VENOUS BLD VENIPUNCTURE: CPT | Performed by: STUDENT IN AN ORGANIZED HEALTH CARE EDUCATION/TRAINING PROGRAM

## 2018-10-27 PROCEDURE — 83690 ASSAY OF LIPASE: CPT | Performed by: STUDENT IN AN ORGANIZED HEALTH CARE EDUCATION/TRAINING PROGRAM

## 2018-10-27 PROCEDURE — 85025 COMPLETE CBC W/AUTO DIFF WBC: CPT | Performed by: STUDENT IN AN ORGANIZED HEALTH CARE EDUCATION/TRAINING PROGRAM

## 2018-10-27 PROCEDURE — 80053 COMPREHEN METABOLIC PANEL: CPT | Performed by: STUDENT IN AN ORGANIZED HEALTH CARE EDUCATION/TRAINING PROGRAM

## 2018-10-27 NOTE — ED TRIAGE NOTES
TRIAGE NOTE: \"I have pain in my liver side (points to RUQ) for a long time. It comes and goes. It's been for 1 month now, heavy for the last 2 days. Denies n/v/d.\"

## 2018-10-27 NOTE — DISCHARGE INSTRUCTIONS

## 2018-10-27 NOTE — ED PROVIDER NOTES
50 y.o. male with past medical history significant for diabetes, HTN, sleep apnea, esophageal reflux, fatty liver, and foreign body in foot who presents from home via personal vehicle with chief complaint of abdominal pain. Pt reports that he has a history of a fatty liver for 10-15 years. Pt states that pain has lasted for 1 month. Pt states that within the last 5 days the pain has been more frequent and more severe. Pt rates his pain to be a \"7.5\"/10. Pt describes his pain as a \"pushing from inside out\" as well as a \"pinching\" feeling. Pt denies nausea, vomiting, difficulty urinating, and hematuria. Pt denies ever having surgery on his abdomen. There are no other acute medical concerns at this time. Social hx: Never smoker. PCP: Kt Palma MD 
 
Note written by rosa Riddle, as dictated by Rossy Rivera MD 9:14 AM 
 
 
 
 
Abdominal Pain Pertinent negatives include no fever, no diarrhea, no nausea, no vomiting, no constipation, no dysuria, no hematuria, no headaches, no arthralgias, no myalgias and no chest pain. Past Medical History:  
Diagnosis Date  Diabetes ([facility] [address]  Esophageal reflux   
 reported by patient has had 2 endoscopies in [facility]  Fatty liver   
 reported by patient seen on u/s in [facility]  Foreign body in foot, right 10/23/2017  Hypertension  Sleep apnea, obstructive 4/2014 AHI 32.3, does not have machine History reviewed. No pertinent surgical history. Family History:  
Problem Relation Age of Onset  Heart Disease Father Social History Socioeconomic History  Marital status:  Spouse name: Not on file  Number of children: Not on file  Years of education: Not on file  Highest education level: Not on file Social Needs  Financial resource strain: Not on file  Food insecurity - worry: Not on file  Food insecurity - inability: Not on file  Transportation needs - medical: Not on file  Transportation needs - non-medical: Not on file Occupational History  Not on file Tobacco Use  Smoking status: Never Smoker  Smokeless tobacco: Never Used Substance and Sexual Activity  Alcohol use: No  
 Drug use: No  
 Sexual activity: Yes  
  Partners: Female Comment:  Other Topics Concern  Not on file Social History Narrative  Not on file ALLERGIES: Patient has no known allergies. Review of Systems Constitutional: Negative for chills, diaphoresis, fatigue and fever. HENT: Negative for congestion, rhinorrhea, sinus pressure, sore throat, trouble swallowing and voice change. Eyes: Negative for photophobia and visual disturbance. Respiratory: Negative for cough, chest tightness and shortness of breath. Cardiovascular: Negative for chest pain, palpitations and leg swelling. Gastrointestinal: Positive for abdominal pain (RUQ and RLQ ). Negative for blood in stool, constipation, diarrhea, nausea and vomiting. Genitourinary: Negative for decreased urine volume, difficulty urinating, dysuria and hematuria. Musculoskeletal: Negative for arthralgias, myalgias and neck pain. Neurological: Negative for dizziness, weakness, light-headedness, numbness and headaches. All other systems reviewed and are negative. Vitals:  
 10/27/18 9743 BP: 131/74 Pulse: 76 Resp: 16 Temp: 98.1 °F (36.7 °C) SpO2: 99% Weight: 89.6 kg (197 lb 8.5 oz) Height: 5' 10\" (1.778 m) Physical Exam  
Constitutional: He is oriented to person, place, and time. He appears well-developed and well-nourished. No distress. HENT:  
Head: Normocephalic and atraumatic. Nose: Nose normal.  
Mouth/Throat: Oropharynx is clear and moist. No oropharyngeal exudate. Eyes: Conjunctivae and EOM are normal. Right eye exhibits no discharge. Left eye exhibits no discharge. No scleral icterus. Neck: Normal range of motion. Neck supple. No JVD present.  No tracheal deviation present. No thyromegaly present. Cardiovascular: Normal rate, regular rhythm, normal heart sounds and intact distal pulses. Exam reveals no gallop and no friction rub. No murmur heard. Pulmonary/Chest: Effort normal and breath sounds normal. No stridor. No respiratory distress. He has no wheezes. He has no rales. He exhibits no tenderness. Abdominal: Bowel sounds are normal. He exhibits no distension and no mass. There is tenderness (mild) in the right upper quadrant and right lower quadrant. There is no rebound. Musculoskeletal: Normal range of motion. He exhibits no edema or tenderness. Lymphadenopathy:  
  He has no cervical adenopathy. Neurological: He is alert and oriented to person, place, and time. No cranial nerve deficit. Coordination normal.  
Skin: Skin is warm and dry. No rash noted. He is not diaphoretic. No erythema. No pallor. Psychiatric: He has a normal mood and affect. His behavior is normal. Judgment and thought content normal.  
Nursing note and vitals reviewed. Note written by rosa Philippe, as dictated by Renetta Pagan MD 9:14 AM 
 
MDM Number of Diagnoses or Management Options Abdominal pain, right upper quadrant:  
Diagnosis management comments: A/P:  Cholelithiasis, cholecystitis, msk strain. 49 y/o male with RUQ tenderness concern for mentioned. Cbc, cmp, lipase, US RUQ Amount and/or Complexity of Data Reviewed Clinical lab tests: ordered and reviewed Tests in the radiology section of CPT®: reviewed and ordered Independent visualization of images, tracings, or specimens: yes Risk of Complications, Morbidity, and/or Mortality Presenting problems: moderate Diagnostic procedures: moderate Management options: moderate Patient Progress Patient progress: stable Procedures PROGRESS NOTE: 
11:17 AM 
I discussed pt's imaging and labs. Pt will be discharged.   
 
  
8:43 PM 
 The patient has been reevaluated. The patient is ready for discharge. The patient's signs, symptoms, diagnosis, and discharge instructions have been discussed and the patient/ family has conveyed their understanding. The patient is to follow up as recommended or return to the ED should their symptoms worsen. Plan has been discussed and the patient is in agreement. LABORATORY TESTS: 
No results found for this or any previous visit (from the past 12 hour(s)). IMAGING RESULTS: 
US ABD LTD Final Result No results found. MEDICATIONS GIVEN: 
Medications - No data to display IMPRESSION: 
1. Abdominal pain, right upper quadrant PLAN: 
1. Discharge Medication List as of 10/27/2018 11:17 AM  
  
 
2. Follow-up Information Follow up With Specialties Details Why Contact Info Trini Graham MD Internal Medicine  If symptoms worsen [address] [facility] [address] [facility] 
[phone] [facility] EMERGENCY DEP Emergency Medicine  If symptoms worsen [address] [place] [address] 
[phone] Return to ED for new or worsening symptoms Karol Forrest MD

## 2018-11-01 ENCOUNTER — HOSPITAL ENCOUNTER (EMERGENCY)
Age: 53
Discharge: HOME | End: 2018-11-01
Attending: EMERGENCY MEDICINE | Admitting: EMERGENCY MEDICINE
Payer: SELF-PAY

## 2018-11-01 VITALS
TEMPERATURE: 98.2 F | OXYGEN SATURATION: 98 % | HEIGHT: 66 IN | RESPIRATION RATE: 16 BRPM | SYSTOLIC BLOOD PRESSURE: 144 MMHG | BODY MASS INDEX: 31.5 KG/M2 | HEART RATE: 88 BPM | WEIGHT: 195.99 LBS | DIASTOLIC BLOOD PRESSURE: 87 MMHG

## 2018-11-01 DIAGNOSIS — H11.31 SUBCONJUNCTIVAL HEMORRHAGE OF RIGHT EYE: Primary | ICD-10-CM

## 2018-11-01 DIAGNOSIS — S03.00XA TMJ (DISLOCATION OF TEMPOROMANDIBULAR JOINT), INITIAL ENCOUNTER: ICD-10-CM

## 2018-11-01 PROCEDURE — 99282 EMERGENCY DEPT VISIT SF MDM: CPT

## 2018-11-01 RX ORDER — IBUPROFEN 600 MG/1
600 TABLET ORAL
Qty: 20 TAB | Refills: 0 | Status: SHIPPED | OUTPATIENT
Start: 2018-11-01 | End: 2019-04-29

## 2018-11-02 NOTE — ED TRIAGE NOTES
Pt arrives via personal vehicle from home for c/o eye and dental pain. Pt takes Lyrica for nerve pain in face. Pt's main complaint is that with the Lyrica and some Ibuprofen that the pain did not alleviate. Dental pain is worse with chewing/moving mouth. No vision changes. -n/v. +dizziness.

## 2018-12-17 ENCOUNTER — HOSPITAL ENCOUNTER (OUTPATIENT)
Dept: LAB | Age: 53
Discharge: HOME | End: 2018-12-17

## 2018-12-17 LAB
ALBUMIN SERPL-MCNC: 3.8 G/DL (ref 3.5–5)
ALBUMIN/GLOB SERPL: 1 {RATIO} (ref 1.1–2.2)
ALP SERPL-CCNC: 70 U/L (ref 45–117)
ALT SERPL-CCNC: 38 U/L (ref 12–78)
ANION GAP SERPL CALC-SCNC: 10 MMOL/L (ref 5–15)
AST SERPL-CCNC: 18 U/L (ref 15–37)
BILIRUB SERPL-MCNC: 0.8 MG/DL (ref 0.2–1)
BUN SERPL-MCNC: 13 MG/DL (ref 6–20)
BUN/CREAT SERPL: 15 (ref 12–20)
CALCIUM SERPL-MCNC: 8.7 MG/DL (ref 8.5–10.1)
CHLORIDE SERPL-SCNC: 103 MMOL/L (ref 97–108)
CHOLEST SERPL-MCNC: 200 MG/DL
CO2 SERPL-SCNC: 24 MMOL/L (ref 21–32)
COMMENT, HOLDF: NORMAL
CREAT SERPL-MCNC: 0.87 MG/DL (ref 0.7–1.3)
GLOBULIN SER CALC-MCNC: 3.7 G/DL (ref 2–4)
GLUCOSE SERPL-MCNC: 149 MG/DL (ref 65–100)
HDLC SERPL-MCNC: 60 MG/DL
HDLC SERPL: 3.3 {RATIO} (ref 0–5)
LDLC SERPL CALC-MCNC: 116.8 MG/DL (ref 0–100)
LIPID PROFILE,FLP: ABNORMAL
POTASSIUM SERPL-SCNC: 4 MMOL/L (ref 3.5–5.1)
PROT SERPL-MCNC: 7.5 G/DL (ref 6.4–8.2)
SAMPLES BEING HELD,HOLD: NORMAL
SODIUM SERPL-SCNC: 137 MMOL/L (ref 136–145)
TRIGL SERPL-MCNC: 116 MG/DL (ref ?–150)
VLDLC SERPL CALC-MCNC: 23.2 MG/DL

## 2018-12-17 PROCEDURE — 80061 LIPID PANEL: CPT

## 2018-12-17 PROCEDURE — 80053 COMPREHEN METABOLIC PANEL: CPT

## 2019-04-29 ENCOUNTER — APPOINTMENT (OUTPATIENT)
Dept: GENERAL RADIOLOGY | Age: 54
End: 2019-04-29
Attending: PHYSICIAN ASSISTANT
Payer: MEDICAID

## 2019-04-29 ENCOUNTER — HOSPITAL ENCOUNTER (EMERGENCY)
Age: 54
Discharge: HOME | End: 2019-04-29
Attending: EMERGENCY MEDICINE
Payer: MEDICAID

## 2019-04-29 ENCOUNTER — APPOINTMENT (OUTPATIENT)
Dept: CT IMAGING | Age: 54
End: 2019-04-29
Attending: PHYSICIAN ASSISTANT
Payer: MEDICAID

## 2019-04-29 VITALS
SYSTOLIC BLOOD PRESSURE: 133 MMHG | TEMPERATURE: 98.4 F | RESPIRATION RATE: 16 BRPM | HEART RATE: 94 BPM | OXYGEN SATURATION: 98 % | DIASTOLIC BLOOD PRESSURE: 90 MMHG

## 2019-04-29 DIAGNOSIS — R06.02 SOB (SHORTNESS OF BREATH): ICD-10-CM

## 2019-04-29 DIAGNOSIS — R51.9 NONINTRACTABLE HEADACHE, UNSPECIFIED CHRONICITY PATTERN, UNSPECIFIED HEADACHE TYPE: Primary | ICD-10-CM

## 2019-04-29 LAB
ANION GAP BLD CALC-SCNC: 18 MMOL/L (ref 10–20)
ATRIAL RATE: 91 BPM
BUN BLD-MCNC: 21 MG/DL (ref 9–20)
CA-I BLD-MCNC: 1.18 MMOL/L (ref 1.12–1.32)
CALCULATED P AXIS, ECG09: 58 DEGREES
CALCULATED R AXIS, ECG10: 34 DEGREES
CALCULATED T AXIS, ECG11: 43 DEGREES
CHLORIDE BLD-SCNC: 102 MMOL/L (ref 98–107)
CO2 BLD-SCNC: 23 MMOL/L (ref 21–32)
CREAT BLD-MCNC: 1.3 MG/DL (ref 0.6–1.3)
D DIMER PPP FEU-MCNC: 0.53 MG/L FEU (ref 0–0.65)
DIAGNOSIS, 93000: NORMAL
GLUCOSE BLD-MCNC: 230 MG/DL (ref 65–100)
HCT VFR BLD CALC: 45 % (ref 36.6–50.3)
P-R INTERVAL, ECG05: 168 MS
POTASSIUM BLD-SCNC: 4 MMOL/L (ref 3.5–5.1)
Q-T INTERVAL, ECG07: 354 MS
QRS DURATION, ECG06: 78 MS
QTC CALCULATION (BEZET), ECG08: 435 MS
S PYO AG THROAT QL: NEGATIVE
SERVICE CMNT-IMP: ABNORMAL
SODIUM BLD-SCNC: 138 MMOL/L (ref 136–145)
TROPONIN I BLD-MCNC: <0.04 NG/ML (ref 0–0.08)
VENTRICULAR RATE, ECG03: 91 BPM

## 2019-04-29 PROCEDURE — 93005 ELECTROCARDIOGRAM TRACING: CPT

## 2019-04-29 PROCEDURE — 87070 CULTURE OTHR SPECIMN AEROBIC: CPT

## 2019-04-29 PROCEDURE — 84484 ASSAY OF TROPONIN QUANT: CPT

## 2019-04-29 PROCEDURE — 99284 EMERGENCY DEPT VISIT MOD MDM: CPT

## 2019-04-29 PROCEDURE — 71046 X-RAY EXAM CHEST 2 VIEWS: CPT

## 2019-04-29 PROCEDURE — 70450 CT HEAD/BRAIN W/O DYE: CPT

## 2019-04-29 PROCEDURE — 74011250637 HC RX REV CODE- 250/637: Performed by: PHYSICIAN ASSISTANT

## 2019-04-29 PROCEDURE — 85379 FIBRIN DEGRADATION QUANT: CPT

## 2019-04-29 PROCEDURE — 36415 COLL VENOUS BLD VENIPUNCTURE: CPT

## 2019-04-29 PROCEDURE — 87880 STREP A ASSAY W/OPTIC: CPT

## 2019-04-29 PROCEDURE — 80047 BASIC METABLC PNL IONIZED CA: CPT

## 2019-04-29 RX ORDER — BUTALBITAL, ACETAMINOPHEN AND CAFFEINE 50; 325; 40 MG/1; MG/1; MG/1
1 TABLET ORAL
Status: COMPLETED | OUTPATIENT
Start: 2019-04-29 | End: 2019-04-29

## 2019-04-29 RX ORDER — NAPROXEN 500 MG/1
500 TABLET ORAL 2 TIMES DAILY WITH MEALS
Qty: 20 TAB | Refills: 0 | Status: SHIPPED | OUTPATIENT
Start: 2019-04-29 | End: 2019-05-09

## 2019-04-29 RX ADMIN — BUTALBITAL, ACETAMINOPHEN AND CAFFEINE 1 TABLET: 50; 325; 40 TABLET ORAL at 13:42

## 2019-04-29 NOTE — LETTER
[address] 
[address] [place] [id number] [phone] Work/School Note Date: 4/29/2019 To Whom It May concern: 
 
Hannah Walsh was seen and treated today in the emergency room by the following provider(s): 
Attending Provider: Barbara Del Rio MD 
Physician Assistant: Anali SANZ, [address]ri[address]. Hannah Walsh may return to work on Friday May 3, 2019 Sincerely, Azeem Oneal

## 2019-04-29 NOTE — ED NOTES
PA reviewed discharge instructions and options with patient and patient verbalized understanding. RN reviewed discharge instructions using teachback method. Pt ambulated to exit without difficulty and in no signs of acute distress escorted by wife who will drive home. No complaints or needs expressed at this time. Patient was counseled on medications prescribed at discharge. VSS at time of discharge. Pt to call PCP and Endocrine in the morning for follow up.

## 2019-04-29 NOTE — ED PROVIDER NOTES
47 yo male with medical hx remarkable for DM, esophageal reflux, hypertension presenting with complaint of sore throat for past three days with associated cough. Today with associated frontal HA, 10/10. Tried laying back and applied cold compress with some improvement. Started after intercourse with wife. Also complains of SOb x months worse with activity. Has CP intermittently. No associated LE edema. No fever,  sore throat, cough, rhinorrhea, sneezing,abdominal pain, nausea, vomiting, or urinary complaints. Headache Associated symptoms include shortness of breath and nausea. Pertinent negatives include no fever and no vomiting. Sore Throat Associated symptoms include congestion, headaches, shortness of breath and cough. Pertinent negatives include no diarrhea, no vomiting and no ear pain. Past Medical History:  
Diagnosis Date  Diabetes ([facility] [address]  Esophageal reflux   
 reported by patient has had 2 endoscopies in [facility]  Fatty liver   
 reported by patient seen on u/s in [facility]  Foreign body in foot, right 10/23/2017  Hypertension  Sleep apnea, obstructive 4/2014 AHI 32.3, does not have machine No past surgical history on file. Family History:  
Problem Relation Age of Onset  Heart Disease Father Social History Socioeconomic History  Marital status:  Spouse name: Not on file  Number of children: Not on file  Years of education: Not on file  Highest education level: Not on file Occupational History  Not on file Social Needs  Financial resource strain: Not on file  Food insecurity:  
  Worry: Not on file Inability: Not on file  Transportation needs:  
  Medical: Not on file Non-medical: Not on file Tobacco Use  Smoking status: Never Smoker  Smokeless tobacco: Never Used Substance and Sexual Activity  Alcohol use: No  
 Drug use: No  
 Sexual activity: Yes  
  Partners: Female Comment:  Lifestyle  Physical activity:  
  Days per week: Not on file Minutes per session: Not on file  Stress: Not on file Relationships  Social connections:  
  Talks on phone: Not on file Gets together: Not on file Attends [employer] service: Not on file Active member of club or organization: Not on file Attends meetings of clubs or organizations: Not on file Relationship status: Not on file  Intimate partner violence:  
  Fear of current or ex partner: Not on file Emotionally abused: Not on file Physically abused: Not on file Forced sexual activity: Not on file Other Topics Concern  Not on file Social History Narrative  Not on file ALLERGIES: Patient has no known allergies. Review of Systems Constitutional: Negative for activity change, appetite change and fever. HENT: Positive for congestion and sore throat. Negative for ear pain and rhinorrhea. Respiratory: Positive for cough and shortness of breath. Cardiovascular: Positive for chest pain. Gastrointestinal: Positive for nausea. Negative for abdominal pain, diarrhea and vomiting. Genitourinary: Negative for difficulty urinating, dysuria and frequency. Neurological: Positive for headaches. Vitals:  
 04/29/19 1258 Pulse: (!) 104 SpO2: 98% Physical Exam  
Constitutional: He is oriented to person, place, and time. He appears well-developed and well-nourished. No distress. Well appearing adult male in NAd HENT:  
Head: Normocephalic and atraumatic. Right Ear: Tympanic membrane, external ear and ear canal normal. No middle ear effusion. Left Ear: Tympanic membrane, external ear and ear canal normal.  No middle ear effusion. Nose: Nose normal.  
Mouth/Throat: Oropharynx is clear and moist and mucous membranes are normal. No oropharyngeal exudate. Eyes: Pupils are equal, round, and reactive to light.  Conjunctivae and EOM are normal. Right eye exhibits no discharge. Left eye exhibits no discharge. Neck: Normal range of motion. Neck supple. No meningeal irritation Cardiovascular: Normal rate, regular rhythm and normal heart sounds. Pulmonary/Chest: Effort normal and breath sounds normal. He has no wheezes. He has no rales. Abdominal: Soft. Bowel sounds are normal. He exhibits no distension. There is no tenderness. There is no guarding. Musculoskeletal: Normal range of motion. Lymphadenopathy:  
  He has no cervical adenopathy. Neurological: He is alert and oriented to person, place, and time. No cranial nerve deficit. Skin: Skin is warm and dry. He is not diaphoretic. Psychiatric: He has a normal mood and affect. His behavior is normal.  
Nursing note and vitals reviewed. MDM Number of Diagnoses or Management Options Nonintractable headache, unspecified chronicity pattern, unspecified headache type:  
SOB (shortness of breath):  
Diagnosis management comments: 49 yo male with complaint of frontal HA. Non-focal neuro exam. No meningeal irritation. Low comcern for ICH/mass. ? Cluster vs tension vs sinus HA Also with SOB x months. Will eval for ACS vs PE. [place], [place] Amount and/or Complexity of Data Reviewed Clinical lab tests: ordered and reviewed Tests in the radiology section of CPT®: ordered and reviewed Independent visualization of images, tracings, or specimens: yes Procedures Progress note EKG interpretation:  
Rhythm: normal sinus rhythm; and regular . Rate (approx.): 91; Axis: normal; P wave: normal; QRS interval: normal ; ST/T wave: non-specific changes. [place], [place] Trop -. D dimer not elevated. Xray chest clear. CT head - for acute findings. HA resolved. Will refer to cards. Pt requesting referral to endocrinology for DM. [place], [place] Patient's results have been reviewed with them.   Patient and/or family have verbally conveyed their understanding and agreement of the patient's signs, symptoms, diagnosis, treatment and prognosis and additionally agree to follow up as recommended or return to the Emergency Room should their condition change prior to follow-up. Discharge instructions have also been provided to the patient with some educational information regarding their diagnosis as well a list of reasons why they would want to return to the ER prior to their follow-up appointment should their condition change.  Tarun Oneal

## 2019-04-29 NOTE — DISCHARGE INSTRUCTIONS
Patient Education        Headache: Care Instructions  Your Care Instructions    Headaches have many possible causes. Most headaches aren't a sign of a more serious problem, and they will get better on their own. Home treatment may help you feel better faster. The doctor has checked you carefully, but problems can develop later. If you notice any problems or new symptoms, get medical treatment right away. Follow-up care is a key part of your treatment and safety. Be sure to make and go to all appointments, and call your doctor if you are having problems. It's also a good idea to know your test results and keep a list of the medicines you take. How can you care for yourself at home? · Do not drive if you have taken a prescription pain medicine. · Rest in a quiet, dark room until your headache is gone. Close your eyes and try to relax or go to sleep. Don't watch TV or read. · Put a cold, moist cloth or cold pack on the painful area for 10 to 20 minutes at a time. Put a thin cloth between the cold pack and your skin. · Use a warm, moist towel or a heating pad set on low to relax tight shoulder and neck muscles. · Have someone gently massage your neck and shoulders. · Take pain medicines exactly as directed. ? If the doctor gave you a prescription medicine for pain, take it as prescribed. ? If you are not taking a prescription pain medicine, ask your doctor if you can take an over-the-counter medicine. · Be careful not to take pain medicine more often than the instructions allow, because you may get worse or more frequent headaches when the medicine wears off. · Do not ignore new symptoms that occur with a headache, such as a fever, weakness or numbness, vision changes, or confusion. These may be signs of a more serious problem. To prevent headaches  · Keep a headache diary so you can figure out what triggers your headaches. Avoiding triggers may help you prevent headaches.  Record when each headache began, how long it lasted, and what the pain was like (throbbing, aching, stabbing, or dull). Write down any other symptoms you had with the headache, such as nausea, flashing lights or dark spots, or sensitivity to bright light or loud noise. Note if the headache occurred near your period. List anything that might have triggered the headache, such as certain foods (chocolate, cheese, wine) or odors, smoke, bright light, stress, or lack of sleep. · Find healthy ways to deal with stress. Headaches are most common during or right after stressful times. Take time to relax before and after you do something that has caused a headache in the past.  · Try to keep your muscles relaxed by keeping good posture. Check your jaw, face, neck, and shoulder muscles for tension, and try relaxing them. When sitting at a desk, change positions often, and stretch for 30 seconds each hour. · Get plenty of sleep and exercise. · Eat regularly and well. Long periods without food can trigger a headache. · Treat yourself to a massage. Some people find that regular massages are very helpful in relieving tension. · Limit caffeine by not drinking too much coffee, tea, or soda. But don't quit caffeine suddenly, because that can also give you headaches. · Reduce eyestrain from computers by blinking frequently and looking away from the computer screen every so often. Make sure you have proper eyewear and that your monitor is set up properly, about an arm's length away. · Seek help if you have depression or anxiety. Your headaches may be linked to these conditions. Treatment can both prevent headaches and help with symptoms of anxiety or depression. When should you call for help? Call 911 anytime you think you may need emergency care. For example, call if:    · You have signs of a stroke. These may include:  ? Sudden numbness, paralysis, or weakness in your face, arm, or leg, especially on only one side of your body.   ? Sudden vision changes. ? Sudden trouble speaking. ? Sudden confusion or trouble understanding simple statements. ? Sudden problems with walking or balance. ? A sudden, severe headache that is different from past headaches.    Call your doctor now or seek immediate medical care if:    · You have a new or worse headache.     · Your headache gets much worse. Where can you learn more? Go to http://eren-jennifer.info/. Enter M271 in the search box to learn more about \"Headache: Care Instructions. \"  Current as of: Jenny 3, 2018  Content Version: 11.9  © 8704-7868 [employer]. Care instructions adapted under license by [employer] (which disclaims liability or warranty for this information). If you have questions about a medical condition or this instruction, always ask your healthcare professional. [place] [address] any warranty or liability for your use of this information. Patient Education        Shortness of Breath: Care Instructions  Your Care Instructions  Shortness of breath has many causes. Sometimes conditions such as anxiety can lead to shortness of breath. Some people get mild shortness of breath when they exercise. Trouble breathing also can be a symptom of a serious problem, such as asthma, lung disease, emphysema, heart problems, and pneumonia. If your shortness of breath continues, you may need tests and treatment. Watch for any changes in your breathing and other symptoms. Follow-up care is a key part of your treatment and safety. Be sure to make and go to all appointments, and call your doctor if you are having problems. It's also a good idea to know your test results and keep a list of the medicines you take. How can you care for yourself at home? · Do not smoke or allow others to smoke around you. If you need help quitting, talk to your doctor about stop-smoking programs and medicines.  These can increase your chances of quitting for good.  · Get plenty of rest and sleep. · Take your medicines exactly as prescribed. Call your doctor if you think you are having a problem with your medicine. · Find healthy ways to deal with stress. ? Exercise daily. ? Get plenty of sleep. ? Eat regularly and well. When should you call for help? Call 911 anytime you think you may need emergency care. For example, call if:    · You have severe shortness of breath.     · You have symptoms of a heart attack. These may include:  ? Chest pain or pressure, or a strange feeling in the chest.  ? Sweating. ? Shortness of breath. ? Nausea or vomiting. ? Pain, pressure, or a strange feeling in the back, neck, jaw, or upper belly or in one or both shoulders or arms. ? Lightheadedness or sudden weakness. ? A fast or irregular heartbeat. After you call 911, the  may tell you to chew 1 adult-strength or 2 to 4 low-dose aspirin. Wait for an ambulance. Do not try to drive yourself.    Call your doctor now or seek immediate medical care if:    · Your shortness of breath gets worse or you start to wheeze. Wheezing is a high-pitched sound when you breathe.     · You wake up at night out of breath or have to prop your head up on several pillows to breathe.     · You are short of breath after only light activity or while at rest.    Watch closely for changes in your health, and be sure to contact your doctor if:    · You do not get better over the next 1 to 2 days. Where can you learn more? Go to http://eren-jennifer.info/. Enter S780 in the search box to learn more about \"Shortness of Breath: Care Instructions. \"  Current as of: September 5, 2018  Content Version: 11.9  © 6873-9305 [employer]. Care instructions adapted under license by [employer] (which disclaims liability or warranty for this information).  If you have questions about a medical condition or this instruction, always ask your healthcare professional. [employer] disclaims any warranty or liability for your use of this information.

## 2019-05-01 LAB
BACTERIA SPEC CULT: NORMAL
SERVICE CMNT-IMP: NORMAL

## 2019-05-30 ENCOUNTER — APPOINTMENT (OUTPATIENT)
Dept: CT IMAGING | Age: 54
End: 2019-05-30
Attending: PHYSICIAN ASSISTANT
Payer: MEDICAID

## 2019-05-30 ENCOUNTER — HOSPITAL ENCOUNTER (EMERGENCY)
Age: 54
Discharge: HOME | End: 2019-05-30
Attending: EMERGENCY MEDICINE | Admitting: EMERGENCY MEDICINE
Payer: MEDICAID

## 2019-05-30 VITALS
TEMPERATURE: 98.4 F | BODY MASS INDEX: 31.5 KG/M2 | HEIGHT: 66 IN | WEIGHT: 196 LBS | HEART RATE: 84 BPM | RESPIRATION RATE: 16 BRPM | SYSTOLIC BLOOD PRESSURE: 127 MMHG | DIASTOLIC BLOOD PRESSURE: 81 MMHG | OXYGEN SATURATION: 98 %

## 2019-05-30 DIAGNOSIS — Z76.0 MEDICATION REFILL: Primary | ICD-10-CM

## 2019-05-30 DIAGNOSIS — E11.9 DIABETES (HCC): ICD-10-CM

## 2019-05-30 LAB
ANION GAP BLD CALC-SCNC: 18 MMOL/L (ref 10–20)
BUN BLD-MCNC: 18 MG/DL (ref 9–20)
CA-I BLD-MCNC: 1.19 MMOL/L (ref 1.12–1.32)
CHLORIDE BLD-SCNC: 100 MMOL/L (ref 98–107)
CO2 BLD-SCNC: 24 MMOL/L (ref 21–32)
CREAT BLD-MCNC: 0.8 MG/DL (ref 0.6–1.3)
GLUCOSE BLD STRIP.AUTO-MCNC: 210 MG/DL (ref 65–100)
GLUCOSE BLD-MCNC: 227 MG/DL (ref 65–100)
HCT VFR BLD CALC: 43 % (ref 36.6–50.3)
POTASSIUM BLD-SCNC: 3.6 MMOL/L (ref 3.5–5.1)
SERVICE CMNT-IMP: ABNORMAL
SERVICE CMNT-IMP: ABNORMAL
SODIUM BLD-SCNC: 137 MMOL/L (ref 136–145)
TROPONIN I BLD-MCNC: <0.04 NG/ML (ref 0–0.08)

## 2019-05-30 PROCEDURE — 99284 EMERGENCY DEPT VISIT MOD MDM: CPT

## 2019-05-30 PROCEDURE — 80047 BASIC METABLC PNL IONIZED CA: CPT

## 2019-05-30 PROCEDURE — 93005 ELECTROCARDIOGRAM TRACING: CPT

## 2019-05-30 PROCEDURE — 84484 ASSAY OF TROPONIN QUANT: CPT

## 2019-05-30 PROCEDURE — 82962 GLUCOSE BLOOD TEST: CPT

## 2019-05-30 PROCEDURE — 70450 CT HEAD/BRAIN W/O DYE: CPT

## 2019-05-30 RX ORDER — METFORMIN HYDROCHLORIDE 1000 MG/1
1000 TABLET ORAL 2 TIMES DAILY
Qty: 30 TAB | Refills: 0 | Status: SHIPPED | OUTPATIENT
Start: 2019-05-30 | End: 2019-06-29

## 2019-05-30 RX ORDER — METFORMIN HYDROCHLORIDE 1000 MG/1
1000 TABLET ORAL 2 TIMES DAILY
Qty: 30 TAB | Refills: 0 | Status: SHIPPED | OUTPATIENT
Start: 2019-05-30 | End: 2019-05-30

## 2019-05-30 RX ORDER — GLIPIZIDE 5 MG/1
5 TABLET ORAL 2 TIMES DAILY
Qty: 20 TAB | Refills: 0 | Status: SHIPPED | OUTPATIENT
Start: 2019-05-30 | End: 2019-05-30

## 2019-05-30 RX ORDER — GLIPIZIDE 5 MG/1
5 TABLET ORAL 2 TIMES DAILY
Qty: 60 TAB | Refills: 0 | Status: SHIPPED | OUTPATIENT
Start: 2019-05-30 | End: 2019-06-29

## 2019-05-31 LAB
ATRIAL RATE: 83 BPM
CALCULATED P AXIS, ECG09: 49 DEGREES
CALCULATED R AXIS, ECG10: 14 DEGREES
CALCULATED T AXIS, ECG11: 39 DEGREES
DIAGNOSIS, 93000: NORMAL
P-R INTERVAL, ECG05: 176 MS
Q-T INTERVAL, ECG07: 370 MS
QRS DURATION, ECG06: 80 MS
QTC CALCULATION (BEZET), ECG08: 434 MS
VENTRICULAR RATE, ECG03: 83 BPM

## 2019-05-31 NOTE — ED PROVIDER NOTES
49 yo male with medical hx remarkable for DM, esophageal reflux, fatty liver, hypertension and JOE presenting with complaint of medication refill. He also is requesting to have medications change because feels that they are \"too strong\". Has been on metformin 1000 mg oral twice daily and  Glucotrol 5 mg daily. Ran out of medication four days ago, Requesting refill on medications. Reports dizziness for the past week. Some HA, sleepiness, excessive thirst, visual disturbance. Denies any CP, SOB, nausea, vomiting or urinary complaint. The history is provided by the patient. Medication Refill   Associated symptoms include headaches. Pertinent negatives include no chest pain, no abdominal pain and no shortness of breath. Past Medical History:   Diagnosis Date    Diabetes ([facility] [address]     Esophageal reflux     reported by patient has had 2 endoscopies in [facility] liver     reported by patient seen on u/s in [facility] 258 body in foot, right 10/23/2017    Hypertension     Sleep apnea, obstructive 4/2014    AHI 32.3, does not have machine       No past surgical history on file.       Family History:   Problem Relation Age of Onset    Heart Disease Father        Social History     Socioeconomic History    Marital status:      Spouse name: Not on file    Number of children: Not on file    Years of education: Not on file    Highest education level: Not on file   Occupational History    Not on file   Social Needs    Financial resource strain: Not on file    Food insecurity:     Worry: Not on file     Inability: Not on file    Transportation needs:     Medical: Not on file     Non-medical: Not on file   Tobacco Use    Smoking status: Never Smoker    Smokeless tobacco: Never Used   Substance and Sexual Activity    Alcohol use: No    Drug use: No    Sexual activity: Yes     Partners: Female     Comment:    Lifestyle    Physical activity:     Days per week: Not on file Minutes per session: Not on file    Stress: Not on file   Relationships    Social connections:     Talks on phone: Not on file     Gets together: Not on file     Attends Bahai service: Not on file     Active member of club or organization: Not on file     Attends meetings of clubs or organizations: Not on file     Relationship status: Not on file    Intimate partner violence:     Fear of current or ex partner: Not on file     Emotionally abused: Not on file     Physically abused: Not on file     Forced sexual activity: Not on file   Other Topics Concern    Not on file   Social History Narrative    Not on file         ALLERGIES: Patient has no known allergies. Review of Systems   Constitutional: Negative. Negative for chills and fever. HENT: Negative for congestion, ear pain, rhinorrhea and sore throat. Eyes: Positive for visual disturbance. Respiratory: Negative for cough, chest tightness and shortness of breath. Cardiovascular: Negative for chest pain. Gastrointestinal: Negative for abdominal pain, constipation, diarrhea and vomiting. Genitourinary: Negative for difficulty urinating, dysuria, frequency and urgency. Neurological: Positive for dizziness and headaches. Negative for weakness and numbness. Psychiatric/Behavioral: Negative for confusion and decreased concentration. All other systems reviewed and are negative. Vitals:    05/30/19 1956   Pulse: 92   SpO2: 98%            Physical Exam   Constitutional: He is oriented to person, place, and time. He appears well-developed and well-nourished. No distress. Well appearing male in NAD     HENT:   Head: Normocephalic and atraumatic. Right Ear: External ear normal.   Left Ear: External ear normal.   Nose: Nose normal.   Mouth/Throat: Oropharynx is clear and moist. No oropharyngeal exudate. Eyes: Pupils are equal, round, and reactive to light. Conjunctivae and EOM are normal. Right eye exhibits no discharge.  Left eye exhibits no discharge. Neck: Normal range of motion. Neck supple. Cardiovascular: Normal rate, regular rhythm and normal heart sounds. Pulmonary/Chest: Effort normal and breath sounds normal. He has no wheezes. He has no rales. Abdominal: Soft. Bowel sounds are normal. He exhibits no distension. There is no tenderness. There is no guarding. Musculoskeletal: Normal range of motion. Lymphadenopathy:     He has no cervical adenopathy. Neurological: He is alert and oriented to person, place, and time. No cranial nerve deficit. He exhibits normal muscle tone. Coordination normal.   Skin: Skin is warm and dry. He is not diaphoretic. Psychiatric: He has a normal mood and affect. His behavior is normal.   Nursing note and vitals reviewed. MDM  Number of Diagnoses or Management Options  Medication refill:   Diagnosis management comments: 49 yo male with hx of DM presenting for medication refill. Also notes some dizziness. Appears well with relatively benign exam. Will cjeck EKG, trop, and electrolytes. [place], [place]         Amount and/or Complexity of Data Reviewed  Clinical lab tests: ordered and reviewed  Tests in the radiology section of CPT®: ordered and reviewed           Procedures          Progress note    EKG interpretation:   Rhythm: normal sinus rhythm; and regular . Rate (approx.): 83; Axis: normal; P wave: normal; QRS interval: normal ; ST/T wave: normal; in  Leads. [place], [place]    Trop -. Glu  227 with nml CO2 and no gap. [place], [place]    Refill given and PCP follow-up list given. [place], [place]    Patient's results have been reviewed with them. Patient and/or family have verbally conveyed their understanding and agreement of the patient's signs, symptoms, diagnosis, treatment and prognosis and additionally agree to follow up as recommended or return to the Emergency Room should their condition change prior to follow-up.   Discharge instructions have also been provided to the patient with some educational information regarding their diagnosis as well a list of reasons why they would want to return to the ER prior to their follow-up appointment should their condition change.  Tarun Oneal

## 2019-05-31 NOTE — ED TRIAGE NOTES
Patient reports he needs his diabetic medication filled. Patient also needs a referral to primary care. Patient has not had medications since Sunday.

## 2019-05-31 NOTE — ED NOTES
Patient given discharge instructions, Advised that we are still awaiting CT result and we would like him to stay for that. Patient verbalized understanding. Patient then seen walking out of ER approx 2 min later. No acute distress noted.

## 2019-07-01 ENCOUNTER — OFFICE VISIT (OUTPATIENT)
Dept: FAMILY MEDICINE CLINIC | Age: 54
End: 2019-07-01

## 2019-07-01 VITALS
SYSTOLIC BLOOD PRESSURE: 134 MMHG | DIASTOLIC BLOOD PRESSURE: 80 MMHG | TEMPERATURE: 98.6 F | RESPIRATION RATE: 17 BRPM | HEIGHT: 66 IN | BODY MASS INDEX: 30.86 KG/M2 | WEIGHT: 192 LBS | OXYGEN SATURATION: 98 % | HEART RATE: 78 BPM

## 2019-07-01 DIAGNOSIS — Z00.00 ROUTINE GENERAL MEDICAL EXAMINATION AT A HEALTH CARE FACILITY: Primary | ICD-10-CM

## 2019-07-01 DIAGNOSIS — E11.00 TYPE 2 DIABETES MELLITUS WITH HYPEROSMOLARITY WITHOUT COMA, UNSPECIFIED WHETHER LONG TERM INSULIN USE (HCC): ICD-10-CM

## 2019-07-01 DIAGNOSIS — G47.33 SLEEP APNEA, OBSTRUCTIVE: ICD-10-CM

## 2019-07-01 DIAGNOSIS — Z12.5 ENCOUNTER FOR PROSTATE CANCER SCREENING: ICD-10-CM

## 2019-07-01 DIAGNOSIS — Z13.220 SCREENING, LIPID: ICD-10-CM

## 2019-07-01 DIAGNOSIS — N52.1 ERECTILE DYSFUNCTION ASSOCIATED WITH TYPE 2 DIABETES MELLITUS (HCC): ICD-10-CM

## 2019-07-01 DIAGNOSIS — E11.69 ERECTILE DYSFUNCTION ASSOCIATED WITH TYPE 2 DIABETES MELLITUS (HCC): ICD-10-CM

## 2019-07-01 LAB — HBA1C MFR BLD HPLC: 8 %

## 2019-07-01 RX ORDER — GLIPIZIDE 5 MG/1
TABLET ORAL 2 TIMES DAILY
COMMUNITY
End: 2019-07-01 | Stop reason: ALTCHOICE

## 2019-07-01 RX ORDER — METFORMIN HYDROCHLORIDE 500 MG/1
1000 TABLET ORAL 2 TIMES DAILY WITH MEALS
COMMUNITY
End: 2019-07-01 | Stop reason: SDUPTHER

## 2019-07-01 RX ORDER — SILDENAFIL 25 MG/1
25 TABLET, FILM COATED ORAL AS NEEDED
Qty: 10 TAB | Refills: 4 | Status: SHIPPED | OUTPATIENT
Start: 2019-07-01 | End: 2019-08-27 | Stop reason: SDUPTHER

## 2019-07-01 RX ORDER — METFORMIN HYDROCHLORIDE 500 MG/1
1000 TABLET ORAL 2 TIMES DAILY WITH MEALS
Qty: 360 TAB | Refills: 3 | Status: SHIPPED | OUTPATIENT
Start: 2019-07-01 | End: 2020-03-20 | Stop reason: SDUPTHER

## 2019-07-01 RX ORDER — GLIPIZIDE 10 MG/1
10 TABLET, FILM COATED, EXTENDED RELEASE ORAL DAILY
Qty: 90 TAB | Refills: 1 | Status: SHIPPED | OUTPATIENT
Start: 2019-07-01 | End: 2020-03-25

## 2019-07-01 NOTE — PATIENT INSTRUCTIONS
Counting Carbohydrates: Care Instructions Your Care Instructions You don't have to eat special foods when you have diabetes. You just have to be careful to eat healthy foods. Carbohydrates (carbs) raise blood sugar higher and quicker than any other nutrient. Carbs are found in desserts, breads and cereals, and fruit. They're also in starchy vegetables. These include potatoes, corn, and grains such as rice and pasta. Carbs are also in milk and yogurt. The more carbs you eat at one time, the higher your blood sugar will rise. Spreading carbs all through the day helps keep your blood sugar levels within your target range. Counting carbs is one of the best ways to keep your blood sugar under control. If you use insulin, counting carbs helps you match the right amount of insulin to the number of grams of carbs in a meal. Then you can change your diet and insulin dose as needed. Testing your blood sugar several times a day can help you learn how carbs affect your blood sugar. A registered dietitian or certified diabetes educator can help you plan meals and snacks. Follow-up care is a key part of your treatment and safety. Be sure to make and go to all appointments, and call your doctor if you are having problems. It's also a good idea to know your test results and keep a list of the medicines you take. How can you care for yourself at home? Know your daily amount of carbohydrates Your daily amount depends on several things, such as your weight, how active you are, which diabetes medicines you take, and what your goals are for your blood sugar levels. A registered dietitian or certified diabetes educator can help you plan how many carbs to include in each meal and snack. For most adults, a guideline for the daily amount of carbs is: · 45 to 60 grams at each meal. That's about the same as 3 to 4 carbohydrate servings. · 15 to 20 grams at each snack. That's about the same as 1 carbohydrate serving. Count carbs Counting carbs lets you know how much rapid-acting insulin to take before you eat. If you use an insulin pump, you get a constant rate of insulin during the day. So the pump must be programmed at meals. This gives you extra insulin to cover the rise in blood sugar after meals. If you take insulin: 
· Learn your own insulin-to-carb ratio. You and your diabetes health professional will figure out the ratio. You can do this by testing your blood sugar after meals. For example, you may need a certain amount of insulin for every 15 grams of carbs. · Add up the carb grams in a meal. Then you can figure out how many units of insulin to take based on your insulin-to-carb ratio. · Exercise lowers blood sugar. You can use less insulin than you would if you were not doing exercise. Keep in mind that timing matters. If you exercise within 1 hour after a meal, your body may need less insulin for that meal than it would if you exercised 3 hours after the meal. Test your blood sugar to find out how exercise affects your need for insulin. If you do or don't take insulin: 
· Look at labels on packaged foods. This can tell you how many carbs are in a serving. You can also use guides from the American Diabetes Association. · Be aware of portions, or serving sizes. If a package has two servings and you eat the whole package, you need to double the number of grams of carbohydrate listed for one serving. · Protein, fat, and fiber do not raise blood sugar as much as carbs do. If you eat a lot of these nutrients in a meal, your blood sugar will rise more slowly than it would otherwise. Eat from all food groups · Eat at least three meals a day. · Plan meals to include food from all the food groups. The food groups include grains, fruits, dairy, proteins, and vegetables. · Talk to your dietitian or diabetes educator about ways to add limited amounts of sweets into your meal plan. · If you drink alcohol, talk to your doctor. It may not be recommended when you are taking certain diabetes medicines. Where can you learn more? Go to http://eren-jennifer.info/. Enter J339 in the search box to learn more about \"Counting Carbohydrates: Care Instructions. \" Current as of: July 25, 2018 Content Version: 11.9 © 6651-9609 [employer] Care instructions adapted under license by [employer] (which disclaims liability or warranty for this information). If you have questions about a medical condition or this instruction, always ask your healthcare professional. [place] [address] any warranty or liability for your use of this information.

## 2019-07-01 NOTE — PROGRESS NOTES
Chief Complaint   Patient presents with    Establish Care    Diabetes     1. Have you been to the ER, urgent care clinic since your last visit? Hospitalized since your last visit? No    2. Have you seen or consulted any other health care providers outside of the [address] [place] [address] since your last visit? Include any pap smears or colon screening.  No

## 2019-07-01 NOTE — PROGRESS NOTES
HPI:   Lillian Landis is a 48 y.o. male who presents to establish care. Patient is a 48 y.o. male that comes today for follow up of Diabetes Mellitus Type 2. Patient does regularly monitor  their FSBG at home. The fasting plasma glucose (fpg) usually runs around 200s post prandial and 160-170 pre prandial mg/L. generally follows a low fat low cholesterol diet  Patients activity level: frequently active in sports  lost,  3 lbs. .  The patient has not experienced recent hypoglycemia. reports that he has never smoked. He has never used smokeless tobacco.  He has not previously received diabetes education. He does not know how to count carbohydrates. Lab Results   Component Value Date/Time    Hemoglobin A1c 7.1 (H) 11/18/2014 04:21 PM    Hemoglobin A1c 6.9 (H) 12/05/2013 11:05 AM         Current Outpatient Medications   Medication Sig Dispense Refill    glipiZIDE SR (GLUCOTROL XL) 10 mg CR tablet Take 1 Tab by mouth daily. 90 Tab 1    metFORMIN (GLUCOPHAGE) 500 mg tablet Take 2 Tabs by mouth two (2) times daily (with meals). 360 Tab 3    sildenafil citrate (VIAGRA) 25 mg tablet Take 1 Tab by mouth as needed (ED). 10 Tab 4    Omeprazole delayed release (PRILOSEC D/R) 20 mg tablet Take 1 tablet by mouth daily. As needed for hiccups or heartburn 39 tablet 0    Blood-Glucose Meter monitoring kit Use to measure blood sugar 2 time daily and as needed. 1 kit 0    Lancets misc Use to measure blood sugar 2 times daily and as needed 1 Package 11    glucose blood VI test strips (ASCENSIA AUTODISC VI, ONE TOUCH ULTRA TEST VI) strip Use to measure blood sugar 2 times daily and as needed 1 Package 11    aspirin delayed-release 81 mg tablet Take 1 tablet by mouth daily.  30 tablet 4      No Known Allergies   Patient Active Problem List   Diagnosis Code    Erectile dysfunction associated with type 2 diabetes mellitus (HCC) E11.69, N52.1    [facility] E11.9    Sleep apnea, obstructive G47.33    TB lung, latent R76.11    Insomnia G47.00    HLD (hyperlipidemia) E78.5    Foreign body in foot, right W80.354J     Past Medical History:   Diagnosis Date    Diabetes ([facility]     Esophageal reflux     reported by patient has had 2 endoscopies in [facility] liver     reported by patient seen on u/s in [facility] Foreign body in foot, right 10/23/2017    Hypertension     Sleep apnea, obstructive 4/2014    AHI 32.3, does not have machine      History reviewed. No pertinent surgical history. No LMP for male patient.    Family History   Problem Relation Age of Onset    Heart Disease Father       Social History     Socioeconomic History    Marital status:      Spouse name: Not on file    Number of children: Not on file    Years of education: Not on file    Highest education level: Not on file   Occupational History    Not on file   Social Needs    Financial resource strain: Not on file    Food insecurity:     Worry: Not on file     Inability: Not on file    Transportation needs:     Medical: Not on file     Non-medical: Not on file   Tobacco Use    Smoking status: Never Smoker    Smokeless tobacco: Never Used   Substance and Sexual Activity    Alcohol use: No    Drug use: No    Sexual activity: Yes     Partners: Female     Comment:    Lifestyle    Physical activity:     Days per week: Not on file     Minutes per session: Not on file    Stress: Not on file   Relationships    Social connections:     Talks on phone: Not on file     Gets together: Not on file     Attends Buddhist service: Not on file     Active member of club or organization: Not on file     Attends meetings of clubs or organizations: Not on file     Relationship status: Not on file    Intimate partner violence:     Fear of current or ex partner: Not on file     Emotionally abused: Not on file     Physically abused: Not on file     Forced sexual activity: Not on file   Other Topics Concern    Not on file   Social History Narrative    Not on file        ROS:   Review of Systems   Constitutional: Negative for fever, malaise/fatigue and weight loss. HENT: Negative for hearing loss. Eyes: Negative for blurred vision and pain. Respiratory: Negative for cough and shortness of breath. Cardiovascular: Negative for chest pain, palpitations and leg swelling. Gastrointestinal: Negative for abdominal pain, blood in stool, constipation, diarrhea and melena. Genitourinary: Negative for dysuria and hematuria. Musculoskeletal: Negative for joint pain. Skin: Negative for rash. Neurological: Negative for headaches. Psychiatric/Behavioral: Negative for depression. The patient is not nervous/anxious and does not have insomnia. Physical Exam:     Visit Vitals  /80   Pulse 78   Temp 98.6 °F (37 °C) (Oral)   Resp 17   Ht 5' 6\" (1.676 m)   Wt 192 lb (87.1 kg)   SpO2 98%   BMI 30.99 kg/m²        Vitals and Nurse Documentation reviewed. Physical Exam   Constitutional: No distress. HENT:   Right Ear: Tympanic membrane is not erythematous and not bulging. No middle ear effusion. Left Ear: Tympanic membrane is not erythematous and not bulging. No middle ear effusion. Nose: No rhinorrhea. Right sinus exhibits no maxillary sinus tenderness and no frontal sinus tenderness. Left sinus exhibits no maxillary sinus tenderness and no frontal sinus tenderness. Mouth/Throat: No oropharyngeal exudate or posterior oropharyngeal erythema. Eyes: EOM and lids are normal.   Cardiovascular: S1 normal and S2 normal. Exam reveals no gallop and no friction rub. No murmur heard. Pulmonary/Chest: Breath sounds normal. He has no wheezes. Lymphadenopathy:     He has no cervical adenopathy. Skin: Skin is warm and dry. Monofilament intact bilaterally. Pulses R: 2+ and L: 2+. No open wounds. No skin lesions.      Psychiatric: Mood and affect normal.         Assessment/ Plan:   Office policies were discussed including hours of operation, on-call providers, and [facility] Diagnoses and all orders for this visit:    1. Routine general medical examination at a health care facility  -     CBC WITH AUTOMATED DIFF  -     METABOLIC PANEL, COMPREHENSIVE  Fasting labs today. Colonoscopy screening will be discussed at next ov. 2. Type 2 diabetes mellitus with hyperosmolarity without coma, unspecified whether long term insulin use (HCC)  -     AMB POC HEMOGLOBIN A1C  -     REFERRAL TO OPHTHALMOLOGY  -     REFERRAL TO DIETITIAN  -     metFORMIN (GLUCOPHAGE) 500 mg tablet; Take 2 Tabs by mouth two (2) times daily (with meals). -     MICROALBUMIN, UR, RAND W/ MICROALB/CREAT RATIO  Uncontrolled. Continue metformin. Increase glipizide to 10mg every day. He will meet with dietitian. Refer to ophthalmology for retinal eye exam.     3. Erectile dysfunction associated with type 2 diabetes mellitus ([facility]  -     sildenafil citrate (VIAGRA) 25 mg tablet; Take 1 Tab by mouth as needed (ED). Stable. Refilled today. Continue current therapy. 4. Screening, lipid  -     LIPID PANEL    5. Encounter for prostate cancer screening  -     PSA W/ REFLX FREE PSA    6. Sleep apnea, obstructive  -     SLEEP MEDICINE REFERRAL as he is noncompliant with CPAP. Other orders  -     glipiZIDE SR (GLUCOTROL XL) 10 mg CR tablet; Take 1 Tab by mouth daily. Follow-up and Dispositions    · Return in about 1 month (around 7/29/2019) for Follow Up.

## 2019-07-02 LAB
ALBUMIN SERPL-MCNC: 4.3 G/DL (ref 3.5–5.5)
ALBUMIN/CREAT UR: 7.2 MG/G CREAT (ref 0–30)
ALBUMIN/GLOB SERPL: 1.5 {RATIO} (ref 1.2–2.2)
ALP SERPL-CCNC: 61 IU/L (ref 39–117)
ALT SERPL-CCNC: 28 IU/L (ref 0–44)
AST SERPL-CCNC: 20 IU/L (ref 0–40)
BASOPHILS # BLD AUTO: 0 X10E3/UL (ref 0–0.2)
BASOPHILS NFR BLD AUTO: 1 %
BILIRUB SERPL-MCNC: 0.9 MG/DL (ref 0–1.2)
BUN SERPL-MCNC: 15 MG/DL (ref 6–24)
BUN/CREAT SERPL: 17 (ref 9–20)
CALCIUM SERPL-MCNC: 9.3 MG/DL (ref 8.7–10.2)
CHLORIDE SERPL-SCNC: 102 MMOL/L (ref 96–106)
CHOLEST SERPL-MCNC: 210 MG/DL (ref 100–199)
CO2 SERPL-SCNC: 23 MMOL/L (ref 20–29)
CREAT SERPL-MCNC: 0.87 MG/DL (ref 0.76–1.27)
CREAT UR-MCNC: 146.5 MG/DL
EOSINOPHIL # BLD AUTO: 0.1 X10E3/UL (ref 0–0.4)
EOSINOPHIL NFR BLD AUTO: 3 %
ERYTHROCYTE [DISTWIDTH] IN BLOOD BY AUTOMATED COUNT: 13.1 % (ref 12.3–15.4)
GLOBULIN SER CALC-MCNC: 2.8 G/DL (ref 1.5–4.5)
GLUCOSE SERPL-MCNC: 125 MG/DL (ref 65–99)
HCT VFR BLD AUTO: 46.5 % (ref 37.5–51)
HDLC SERPL-MCNC: 58 MG/DL
HGB BLD-MCNC: 15.6 G/DL (ref 13–17.7)
IMM GRANULOCYTES # BLD AUTO: 0 X10E3/UL (ref 0–0.1)
IMM GRANULOCYTES NFR BLD AUTO: 0 %
INTERPRETATION, 910389: NORMAL
LDLC SERPL CALC-MCNC: 125 MG/DL (ref 0–99)
LYMPHOCYTES # BLD AUTO: 2.2 X10E3/UL (ref 0.7–3.1)
LYMPHOCYTES NFR BLD AUTO: 44 %
MCH RBC QN AUTO: 28.7 PG (ref 26.6–33)
MCHC RBC AUTO-ENTMCNC: 33.5 G/DL (ref 31.5–35.7)
MCV RBC AUTO: 86 FL (ref 79–97)
MICROALBUMIN UR-MCNC: 10.5 UG/ML
MONOCYTES # BLD AUTO: 0.4 X10E3/UL (ref 0.1–0.9)
MONOCYTES NFR BLD AUTO: 8 %
NEUTROPHILS # BLD AUTO: 2.2 X10E3/UL (ref 1.4–7)
NEUTROPHILS NFR BLD AUTO: 44 %
PLATELET # BLD AUTO: 196 X10E3/UL (ref 150–450)
POTASSIUM SERPL-SCNC: 4.2 MMOL/L (ref 3.5–5.2)
PROT SERPL-MCNC: 7.1 G/DL (ref 6–8.5)
PSA SERPL-MCNC: 0.8 NG/ML (ref 0–4)
RBC # BLD AUTO: 5.44 X10E6/UL (ref 4.14–5.8)
REFLEX CRITERIA: NORMAL
SODIUM SERPL-SCNC: 140 MMOL/L (ref 134–144)
TRIGL SERPL-MCNC: 133 MG/DL (ref 0–149)
VLDLC SERPL CALC-MCNC: 27 MG/DL (ref 5–40)
WBC # BLD AUTO: 5 X10E3/UL (ref 3.4–10.8)

## 2019-07-10 ENCOUNTER — TELEPHONE (OUTPATIENT)
Dept: FAMILY MEDICINE CLINIC | Age: 54
End: 2019-07-10

## 2019-07-10 NOTE — TELEPHONE ENCOUNTER
Chief Complaint   Patient presents with    Prior Auth     PRIOR AUTHORIZATION MEDICATION:  GLIPIZIDE ER 10 MG TABLET, TAKE ONE TABLET DAILY , 90 TABLETS 1 REFILL AS WRITTEN BY BERNADETTE FORTE NP .  APPROVAL FOR MEDICATION JULY 3, 2019 TO JULY 3, 2020. APPROVAL LETTER FAXED TO [facility] [phone] WITH CONFIRMATION RECEIVED . PATIENT INFORMED VIA [facility].

## 2019-08-27 ENCOUNTER — OFFICE VISIT (OUTPATIENT)
Dept: FAMILY MEDICINE CLINIC | Age: 54
End: 2019-08-27

## 2019-08-27 VITALS
DIASTOLIC BLOOD PRESSURE: 80 MMHG | HEIGHT: 66 IN | BODY MASS INDEX: 30.7 KG/M2 | HEART RATE: 89 BPM | SYSTOLIC BLOOD PRESSURE: 142 MMHG | RESPIRATION RATE: 16 BRPM | WEIGHT: 191 LBS | OXYGEN SATURATION: 98 % | TEMPERATURE: 98 F

## 2019-08-27 DIAGNOSIS — M25.571 CHRONIC PAIN OF BOTH ANKLES: ICD-10-CM

## 2019-08-27 DIAGNOSIS — M25.511 CHRONIC PAIN OF BOTH SHOULDERS: ICD-10-CM

## 2019-08-27 DIAGNOSIS — G89.29 CHRONIC PAIN OF BOTH ANKLES: ICD-10-CM

## 2019-08-27 DIAGNOSIS — Z23 ENCOUNTER FOR IMMUNIZATION: ICD-10-CM

## 2019-08-27 DIAGNOSIS — M25.512 CHRONIC PAIN OF BOTH SHOULDERS: ICD-10-CM

## 2019-08-27 DIAGNOSIS — M54.16 LUMBAR BACK PAIN WITH RADICULOPATHY AFFECTING LOWER EXTREMITY: ICD-10-CM

## 2019-08-27 DIAGNOSIS — M25.572 CHRONIC PAIN OF BOTH ANKLES: ICD-10-CM

## 2019-08-27 DIAGNOSIS — E11.65 UNCONTROLLED TYPE 2 DIABETES MELLITUS WITH HYPERGLYCEMIA (HCC): Primary | ICD-10-CM

## 2019-08-27 DIAGNOSIS — I73.9 PERIPHERAL VASCULAR DISEASE (HCC): ICD-10-CM

## 2019-08-27 DIAGNOSIS — E78.2 MIXED HYPERLIPIDEMIA: ICD-10-CM

## 2019-08-27 DIAGNOSIS — Z12.11 ENCOUNTER FOR SCREENING COLONOSCOPY: ICD-10-CM

## 2019-08-27 DIAGNOSIS — G89.29 CHRONIC PAIN OF BOTH SHOULDERS: ICD-10-CM

## 2019-08-27 PROBLEM — E66.9 OBESITY (BMI 30-39.9): Status: ACTIVE | Noted: 2019-08-27

## 2019-08-27 LAB — GLUCOSE POC: 264 MG/DL

## 2019-08-27 RX ORDER — GLIPIZIDE 5 MG/1
5 TABLET, FILM COATED, EXTENDED RELEASE ORAL DAILY
Qty: 30 TAB | Refills: 3 | Status: SHIPPED | OUTPATIENT
Start: 2019-08-27 | End: 2019-12-23

## 2019-08-27 RX ORDER — ATORVASTATIN CALCIUM 20 MG/1
20 TABLET, FILM COATED ORAL DAILY
Qty: 30 TAB | Refills: 3 | Status: SHIPPED | OUTPATIENT
Start: 2019-08-27 | End: 2019-12-23

## 2019-08-27 RX ORDER — SILDENAFIL 25 MG/1
25 TABLET, FILM COATED ORAL AS NEEDED
Qty: 10 TAB | Refills: 4 | Status: SHIPPED | OUTPATIENT
Start: 2019-08-27 | End: 2020-09-09 | Stop reason: SDUPTHER

## 2019-08-27 NOTE — PROGRESS NOTES
Chief Complaint   Patient presents with    Diabetes     follow up    Leg Pain     numbness and pain     1. Have you been to the ER, urgent care clinic since your last visit? Hospitalized since your last visit? No    2. Have you seen or consulted any other health care providers outside of the [address] [place] [address] since your last visit? Include any pap smears or colon screening.  No

## 2019-08-27 NOTE — PROGRESS NOTES
Assessment/Plan:     Diagnoses and all orders for this visit:    1. Uncontrolled type 2 diabetes mellitus with hyperglycemia (HCC)  -      DIABETES FOOT EXAM  -     AMB POC GLUCOSE BLOOD, BY GLUCOSE MONITORING DEVICE  -     glipiZIDE SR (GLUCOTROL XL) 5 mg CR tablet; Take 1 Tab by mouth daily. In addition to 10mg tablet for a total daily dose of 15mg. No recent blood sugar readings. Postprandial reading of 264 is above goal of 180 today. Increase glipizide ER to 5 mg daily for a total daily dose of 15 mg. Continue metformin which is at max dose. Consider the addition of Jardiance if no improvement. 2. Encounter for immunization  -     INFLUENZA VIRUS VAC QUAD,SPLIT,PRESV FREE SYRINGE IM  -     NV IMMUNIZ ADMIN,1 SINGLE/COMB VAC/TOXOID  -     PNEUMOCOCCAL POLYSACCHARIDE VACCINE, 23-VALENT, ADULT OR IMMUNOSUPPRESSED PT DOSE,  -     varicella-zoster recombinant, PF, (SHINGRIX, PF,) 50 mcg/0.5 mL susr injection; 0.5 mL by IntraMUSCular route once for 1 dose. 3. Encounter for screening colonoscopy  -     REFERRAL TO GASTROENTEROLOGY for for screening colonoscopy. 4. Peripheral vascular disease (HCC)  Stable. 5. Chronic pain of both ankles  -     REFERRAL TO SPORTS MEDICINE for additional evaluation. 6. Mixed hyperlipidemia  -     atorvastatin (LIPITOR) 20 mg tablet; Take 1 Tab by mouth daily. Did not start from previous recommendation. Added today. Repeat lipids in three months. 7. Uncontrolled type 2 diabetes circulatory disorder erectile dysfunction (HCC)  -     sildenafil citrate (VIAGRA) 25 mg tablet; Take 1 Tab by mouth as needed (ED). Stable. Refilled today. Continue current therapy. 8. Chronic pain of both shoulders  Sport medicine referral as above. 9. Lumbar back pain with radiculopathy affecting lower extremity  Work towards weight loss through dietary change. Referred to dietitian at this time. Consider evaluation with Dr. Monica Hope if no improvement.   Likely will require MRI Lumbar spine. Time: Greater than 40 minutes was spent with this patient face to face discussing care with greater than 50% of this time was spent in counseling and coordination of care regarding treatment of chronic joint pain and back pain, dietary change and glucose monitoring as well as glucose goals, and medication regimen education. Discussed expected course/resolution/complications of diagnosis in detail with patient. Medication risks/benefits/costs/interactions/alternatives discussed with patient. Pt was given after visit summary which includes diagnoses, current medications & vitals. Pt expressed understanding with the diagnosis and plan          Subjective:      Juan De Guzman is a 48 y.o. male who presents for had concerns including Diabetes (follow up); Leg Pain (numbness and pain ); and Shortness of Breath (after activity ). Reports a chronic history of bilateral ankle pain. Previous evaluations including xrays were negative. Underwent physical therapy without improvement. Interested in additional treatment. Reports a several year history of bilateral shoulder pain. This is his first evaluation. Is not attempting otc treatment. Symptoms are worse after carrying heavy objects. Symptoms are improved with rest.  Symptoms are worsening over time. He denies previous injury. Reports of history of chronic lower back pain. There is occasional radiation to the bilateral lower extremities. He reports numbness and tingling in the lower extremities. 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 He cannot recall physical therapy completed or injection therapy completed in the past.  He is attempting to exercise and lose weight although he feels the back pain limits his ability to exercise. Patient is a 48 y.o. male that comes today for follow up of Diabetes Mellitus Type 2.   Patient does not regularly monitor their FSBG at home. The fasting plasma glucose (fpg) is unknon not attempting to follow a low fat, low cholesterol diet  Patients activity level: active twice weekly for 20 minutes. there was no change in patient's weight. .  The patient has not experienced recent hypoglycemia. reports that he has never smoked. He has never used smokeless tobacco.    Lab Results   Component Value Date/Time    Hemoglobin A1c 7.1 (H) 11/18/2014 04:21 PM    Hemoglobin A1c 6.9 (H) 12/05/2013 11:05 AM     Reports improvement in erectile function with the addition of Viagra and is interested in refill. Patient Active Problem List   Diagnosis Code    Uncontrolled type 2 diabetes circulatory disorder erectile dysfunction (HCC) E11.59, N52.1, E11.65    Sleep apnea, obstructive G47.33    TB lung, latent R76.11    Insomnia G47.00    Mixed hyperlipidemia E78.2    Peripheral vascular disease ([facility] I73.9    Uncontrolled type [facility] diabetes mellitus with hypergly[facility] E11.65    Obesity (BMI 30-39. 9) E66.9       Current Outpatient Medications   Medication Sig Dispense Refill    varicella-zoster recombinant, PF, (SHINGRIX, PF,) 50 mcg/0.5 mL susr injection 0.5 mL by IntraMUSCular route once for 1 dose. 0.5 mL 0    atorvastatin (LIPITOR) 20 mg tablet Take 1 Tab by mouth daily. 30 Tab 3    sildenafil citrate (VIAGRA) 25 mg tablet Take 1 Tab by mouth as needed (ED). 10 Tab 4    glipiZIDE SR (GLUCOTROL XL) 5 mg CR tablet Take 1 Tab by mouth daily. In addition to 10mg tablet for a total daily dose of 15mg. 30 Tab 3    glipiZIDE SR (GLUCOTROL XL) 10 mg CR tablet Take 1 Tab by mouth daily. 90 Tab 1    metFORMIN (GLUCOPHAGE) 500 mg tablet Take 2 Tabs by mouth two (2) times daily (with meals). 360 Tab 3    Blood-Glucose Meter monitoring kit Use to measure blood sugar 2 time daily and as needed.  1 kit 0    Lancets misc Use to measure blood sugar 2 times daily and as needed 1 Package 11    glucose blood VI test strips (ASCENSIA AUTODISC VI, ONE TOUCH ULTRA TEST VI) strip Use to measure blood sugar 2 times daily and as needed 1 Package 11    aspirin delayed-release 81 mg tablet Take 1 tablet by mouth daily. 30 tablet 4    Omeprazole delayed release (PRILOSEC D/R) 20 mg tablet Take 1 tablet by mouth daily. As needed for hiccups or heartburn 39 tablet 0       No Known Allergies    ROS:   Review of Systems   Constitutional: Negative for malaise/fatigue. Eyes: Negative for blurred vision. Respiratory: Negative for shortness of breath. Cardiovascular: Negative for chest pain. Musculoskeletal: Positive for back pain and joint pain. Objective:     Visit Vitals  /80   Pulse 89   Temp 98 °F (36.7 °C) (Oral)   Resp 16   Ht 5' 6\" (1.676 m)   Wt 191 lb (86.6 kg)   SpO2 98%   BMI 30.83 kg/m²       Vitals and Nurse Documentation reviewed. Physical Exam   Constitutional: No distress. Cardiovascular: S1 normal and S2 normal. Exam reveals no gallop and no friction rub. No murmur heard. Pulmonary/Chest: Breath sounds normal. No respiratory distress. Musculoskeletal:        Right ankle: He exhibits decreased range of motion. He exhibits no swelling and no ecchymosis. No tenderness. Left ankle: He exhibits decreased range of motion. He exhibits no swelling. No tenderness. Lumbar back: He exhibits pain and spasm. Skin: Skin is warm and dry.    Psychiatric: Mood and affect normal.

## 2019-08-28 ENCOUNTER — OFFICE VISIT (OUTPATIENT)
Dept: FAMILY MEDICINE CLINIC | Age: 54
End: 2019-08-28

## 2019-08-28 VITALS
TEMPERATURE: 99.1 F | DIASTOLIC BLOOD PRESSURE: 81 MMHG | WEIGHT: 191.4 LBS | OXYGEN SATURATION: 95 % | HEIGHT: 66 IN | SYSTOLIC BLOOD PRESSURE: 139 MMHG | BODY MASS INDEX: 30.76 KG/M2 | HEART RATE: 107 BPM | RESPIRATION RATE: 16 BRPM

## 2019-08-28 DIAGNOSIS — R52 PAIN AT INJECTION SITE, INITIAL ENCOUNTER: ICD-10-CM

## 2019-08-28 DIAGNOSIS — T80.89XA PAIN AT INJECTION SITE, INITIAL ENCOUNTER: ICD-10-CM

## 2019-08-28 DIAGNOSIS — R52 GENERALIZED BODY ACHES: ICD-10-CM

## 2019-08-28 DIAGNOSIS — R53.83 FATIGUE, UNSPECIFIED TYPE: Primary | ICD-10-CM

## 2019-08-28 RX ORDER — IBUPROFEN 600 MG/1
600 TABLET ORAL
Qty: 30 TAB | Refills: 0 | Status: SHIPPED | OUTPATIENT
Start: 2019-08-28 | End: 2019-11-26 | Stop reason: ALTCHOICE

## 2019-08-28 NOTE — LETTER
NOTIFICATION RETURN TO WORK  
 
8/28/2019 5:39 PM 
 
Mr. Hannah Armando [address] [facility] [id number] To Whom It May Concern: 
 
Kayode Marin is currently under the care of MIGDALIA Carter. He will return to work on: 8/30/19 If there are questions or concerns please have the patient contact our office. Sincerely, Shaquille Li MD

## 2019-08-28 NOTE — PROGRESS NOTES
[facility] Note      Subjective:     Chief Complaint   Patient presents with    Fever    Fatigue    Generalized Body Aches     Lillian Landis is a 48y.o. year old male who presents for evaluation of the following:      Fatigue: Onset 30 minutes after injections yesterday  - Had Flu and PCV 23 shots yesterday  Felt unable to do his work as an  today  Pain of left Arm, feels run down, feels generalized weak   Endorses diabetes  Denies coughing, vomiting, sneezing, fever, headache, sweats      Review of Systems   Pertinent positives and negative per HPI. All other systems  reviewed are negative for a Comprehensive ROS (10+).        Past Medical History:   Diagnosis Date    Diabetes ([facility] [address]     Esophageal reflux     reported by patient has had 2 endoscopies in [facility] liver     reported by patient seen on u/s in [facility] body in foot, right 10/23/2017    Hypertension     Sleep apnea, obstructive 4/2014    AHI 32.3, does not have machine        Social History     Socioeconomic History    Marital status:      Spouse name: Not on file    Number of children: Not on file    Years of education: Not on file    Highest education level: Not on file   Occupational History    Not on file   Social Needs    Financial resource strain: Not on file    Food insecurity:     Worry: Not on file     Inability: Not on file    Transportation needs:     Medical: Not on file     Non-medical: Not on file   Tobacco Use    Smoking status: Never Smoker    Smokeless tobacco: Never Used   Substance and Sexual Activity    Alcohol use: No    Drug use: No    Sexual activity: Yes     Partners: Female     Comment:    Lifestyle    Physical activity:     Days per week: Not on file     Minutes per session: Not on file    Stress: Not on file   Relationships    Social connections:     Talks on phone: Not on file     Gets together: Not on file     Attends Protestant service: Not on file     Active member of club or organization: Not on file     Attends meetings of clubs or organizations: Not on file     Relationship status: Not on file    Intimate partner violence:     Fear of current or ex partner: Not on file     Emotionally abused: Not on file     Physically abused: Not on file     Forced sexual activity: Not on file   Other Topics Concern    Not on file   Social History Narrative    Not on file       Family History   Problem Relation Age of Onset    Heart Disease Father        Current Outpatient Medications   Medication Sig    atorvastatin (LIPITOR) 20 mg tablet Take 1 Tab by mouth daily.  sildenafil citrate (VIAGRA) 25 mg tablet Take 1 Tab by mouth as needed (ED).  glipiZIDE SR (GLUCOTROL XL) 5 mg CR tablet Take 1 Tab by mouth daily. In addition to 10mg tablet for a total daily dose of 15mg.  glipiZIDE SR (GLUCOTROL XL) 10 mg CR tablet Take 1 Tab by mouth daily.  metFORMIN (GLUCOPHAGE) 500 mg tablet Take 2 Tabs by mouth two (2) times daily (with meals).  aspirin delayed-release 81 mg tablet Take 1 tablet by mouth daily.  Omeprazole delayed release (PRILOSEC D/R) 20 mg tablet Take 1 tablet by mouth daily. As needed for hiccups or heartburn    Blood-Glucose Meter monitoring kit Use to measure blood sugar 2 time daily and as needed.  Lancets misc Use to measure blood sugar 2 times daily and as needed    glucose blood VI test strips (ASCENSIA AUTODISC VI, ONE TOUCH ULTRA TEST VI) strip Use to measure blood sugar 2 times daily and as needed     No current facility-administered medications for this visit. Objective:     Vitals:    08/28/19 1710   BP: 139/81   Pulse: (!) 107   Resp: 16   Temp: 99.1 °F (37.3 °C)   TempSrc: Oral   SpO2: 95%   Weight: 191 lb 6.4 oz (86.8 kg)   Height: 5' 6\" (1.676 m)       Physical Examination:  General: Alert, cooperative, no distress, appears stated age. Eyes: Conjunctivae clear.  PERRL, EOMs intact. Ears: Normal external ear canals both ears. TM clear and mobile bilaterally   Nose: Nares normal. Septum midline. Mucosa normal. No drainage or sinus tenderness. Mouth/Throat: Lips, mucosa, and tongue normal. No oropharyngeal erythema. No tonsillar enlargement or exudate. Neck: Supple, symmetrical, trachea midline, no adenopathy. No thyroid enlargement/tenderness/nodules  Respiratory: Breathing comfortably, in no acute respiratory distress. Clear to auscultation bilaterally. Normal inspiratory and expiratory ratio. Cardiovascular: Regular rate and rhythm, S1, S2 normal, no murmur, click, rub or gallop.   -Extremities no edema. Pulses 2+ and symmetric radial  Abdomen: Soft, non-tender, not distended. Bowel sounds normal. No masses or organomegaly. MSK: Extremities normal, atraumatic, no effusion. Gait steady and unassisted. Skin: Skin color, texture, turgor normal. No rashes or lesions on exposed skin. Lymph nodes: Cervical, supraclavicular nodes normal.  Neurologic: CNII-XII intact. Strength 5/5 grossly. Sensation and reflexes normal throughout. Psychiatric: Affect appropriate. Mood euthymic.  Thoughts logical. Speech volume and speed normal      Office Visit on 08/27/2019   Component Date Value Ref Range Status    Glucose POC 08/27/2019 264  mg/dL Final   Office Visit on 07/01/2019   Component Date Value Ref Range Status    Hemoglobin A1c (POC) 07/01/2019 8.0  % Final    WBC 07/01/2019 5.0  3.4 - 10.8 x10E3/uL Final    RBC 07/01/2019 5.44  4.14 - 5.80 x10E6/uL Final    HGB 07/01/2019 15.6  13.0 - 17.7 g/dL Final    HCT 07/01/2019 46.5  37.5 - 51.0 % Final    MCV 07/01/2019 86  79 - 97 fL Final    MCH 07/01/2019 28.7  26.6 - 33.0 pg Final    MCHC 07/01/2019 33.5  31.5 - 35.7 g/dL Final    RDW 07/01/2019 13.1  12.3 - 15.4 % Final    PLATELET 39/89/4330 181  150 - 450 x10E3/uL Final    NEUTROPHILS 07/01/2019 44  Not Estab. % Final    Lymphocytes 07/01/2019 44  Not Estab. % Final    MONOCYTES 07/01/2019 8  Not Estab. % Final    EOSINOPHILS 07/01/2019 3  Not Estab. % Final    BASOPHILS 07/01/2019 1  Not Estab. % Final    ABS. NEUTROPHILS 07/01/2019 2.2  1.4 - 7.0 x10E3/uL Final    Abs Lymphocytes 07/01/2019 2.2  0.7 - 3.1 x10E3/uL Final    ABS. MONOCYTES 07/01/2019 0.4  0.1 - 0.9 x10E3/uL Final    ABS. EOSINOPHILS 07/01/2019 0.1  0.0 - 0.4 x10E3/uL Final    ABS. BASOPHILS 07/01/2019 0.0  0.0 - 0.2 x10E3/uL Final    IMMATURE GRANULOCYTES 07/01/2019 0  Not Estab. % Final    ABS. IMM. GRANS. 07/01/2019 0.0  0.0 - 0.1 x10E3/uL Final    Glucose 07/01/2019 125* 65 - 99 mg/dL Final    BUN 07/01/2019 15  6 - 24 mg/dL Final    Creatinine 07/01/2019 0.87  0.76 - 1.27 mg/dL Final    GFR est non-AA 07/01/2019 100  >59 mL/min/1.73 Final    GFR est AA 07/01/2019 116  >59 mL/min/1.73 Final    BUN/Creatinine ratio 07/01/2019 17  9 - 20 Final    Sodium 07/01/2019 140  134 - 144 mmol/L Final    Potassium 07/01/2019 4.2  3.5 - 5.2 mmol/L Final    Chloride 07/01/2019 102  96 - 106 mmol/L Final    CO2 07/01/2019 23  20 - 29 mmol/L Final    Calcium 07/01/2019 9.3  8.7 - 10.2 mg/dL Final    Protein, total 07/01/2019 7.1  6.0 - 8.5 g/dL Final    Albumin 07/01/2019 4.3  3.5 - 5.5 g/dL Final    GLOBULIN, TOTAL 07/01/2019 2.8  1.5 - 4.5 g/dL Final    A-G Ratio 07/01/2019 1.5  1.2 - 2.2 Final    Bilirubin, total 07/01/2019 0.9  0.0 - 1.2 mg/dL Final    Alk. phosphatase 07/01/2019 61  39 - 117 IU/L Final    AST (SGOT) 07/01/2019 20  0 - 40 IU/L Final    ALT (SGPT) 07/01/2019 28  0 - 44 IU/L Final    Cholesterol, total 07/01/2019 210* 100 - 199 mg/dL Final    Triglyceride 07/01/2019 133  0 - 149 mg/dL Final    HDL Cholesterol 07/01/2019 58  >39 mg/dL Final    VLDL, calculated 07/01/2019 27  5 - 40 mg/dL Final    LDL, calculated 07/01/2019 125* 0 - 99 mg/dL Final    Prostate Specific Ag 07/01/2019 0.8  0.0 - 4.0 ng/mL Final    Comment: Roche ECLIA methodology.   According to the American Urological Association, Serum PSA should  decrease and remain at undetectable levels after radical  prostatectomy. The AUA defines biochemical recurrence as an initial  PSA value 0.2 ng/mL or greater followed by a subsequent confirmatory  PSA value 0.2 ng/mL or greater. Values obtained with different assay methods or kits cannot be used  interchangeably. Results cannot be interpreted as absolute evidence  of the presence or absence of malignant disease.  Reflex Criteria 07/01/2019 Comment   Final    Comment: The percent free PSA is performed on a reflex basis only when the  total PSA is between 4.0 and 10.0 ng/mL.  Creatinine, urine 07/01/2019 146.5  Not Estab. mg/dL Final    Microalbumin, urine 07/01/2019 10.5  Not Estab. ug/mL Final    Microalb/Creat ratio (ug/mg creat.) 07/01/2019 7.2  0.0 - 30.0 mg/g creat Final    Comment:                      Normal:                0.0 -  30.0                       Albuminuria:          31.0 - 300.0                       Clinical albuminuria:       >300.0      INTERPRETATION 07/01/2019 Note   Final    Supplemental report is available.    Admission on 05/30/2019, Discharged on 05/30/2019   Component Date Value Ref Range Status    Ventricular Rate 05/30/2019 83  BPM Final    Atrial Rate 05/30/2019 83  BPM Final    P-R Interval 05/30/2019 176  ms Final    QRS Duration 05/30/2019 80  ms Final    Q-T Interval 05/30/2019 370  ms Final    QTC Calculation (Bezet) 05/30/2019 434  ms Final    Calculated P Axis 05/30/2019 49  degrees Final    Calculated R Axis 05/30/2019 14  degrees Final    Calculated T Axis 05/30/2019 39  degrees Final    Diagnosis 05/30/2019    Final                    Value:Normal sinus rhythm  When compared with ECG of 29-APR-2019 13:26,  No significant change was found  Confirmed by Gila Gonsalves MD ([id number]) on 5/31/2019 10:54:25 AM      Glucose (POC) 05/30/2019 210* 65 - 100 mg/dL Final    Comment: (NOTE)  The Accu-Chek Inform II glucometer is not FDA cleared for critically   ill patient use. A study was performed validating the equivalence of   glucometer and clinical laboratory results on this patient   population. Despite the study, use of glucometers with capillary   specimens from critically ill patients, regardless of their location,   makes the test high complexity and requires the performing individual   to comply with CLIA requirements more stringent than those for waived   testing in the hospital setting. Critical thinking skills are   necessary to determine a potentially critically ill patients status   prior to using a glucometer.  Performed by 05/30/2019 Walker Lopez   Final    Troponin-I (POC) 05/30/2019 <0.04  0.00 - 0.08 ng/mL Final    Comment: (NOTE)  The presence of detectable troponin indicates myocardial injury which   may be due to ischemia, myocarditis, trauma, etc. Clinical   correlation is necessary to determine the significance of this   finding. Recommend establishing a baseline troponin using main   clinical laboratory method if serial determinations are anticipated.       Calcium, ionized (POC) 05/30/2019 1.19  1.12 - 1.32 mmol/L Final    Sodium (POC) 05/30/2019 137  136 - 145 mmol/L Final    Potassium (POC) 05/30/2019 3.6  3.5 - 5.1 mmol/L Final    Chloride (POC) 05/30/2019 100  98 - 107 mmol/L Final    CO2 (POC) 05/30/2019 24  21 - 32 mmol/L Final    Anion gap (POC) 05/30/2019 18  10 - 20 mmol/L Final    Glucose (POC) 05/30/2019 227* 65 - 100 mg/dL Final    BUN (POC) 05/30/2019 18  9 - 20 mg/dL Final    Creatinine (POC) 05/30/2019 0.8  0.6 - 1.3 mg/dL Final    GFRAA, POC 05/30/2019 >60  >60 ml/min/1.73m2 Final    GFRNA, POC 05/30/2019 >60  >60 ml/min/1.73m2 Final    Comment: Estimated GFR is calculated using the IDMS-traceable Modification of Diet in Renal Disease (MDRD) Study equation, reported for both  Americans (GFRAA) and non- Americans (GFRNA), and normalized to 1.73m2 body surface area. The physician must decide which value applies to the patient. The MDRD study equation should only be used in individuals age 25 or older. It has not been validated for the following: pregnant women, patients with serious comorbid conditions, or on certain medications, or persons with extremes of body size, muscle mass, or nutritional status.  Hematocrit (POC) 05/30/2019 43  36.6 - 50.3 % Final    Comment 05/30/2019 Comment Not Indicated. Final           Assessment/ Plan:   Diagnoses and all orders for this visit:    1. Fatigue, unspecified type  -     CBC WITH AUTOMATED DIFF  -     BASIC METABOLIC PANEL (7)    2. Generalized body aches  -     CK    3. Pain at injection site, initial encounter  -     ibuprofen (MOTRIN) 600 mg tablet; Take 1 Tab by mouth every eight (8) hours as needed for Pain. Fatigue and body aches are likely reaction to vaccination. No signs of infection at this time. Flu test in office negative. Ibuprofen for pain. Labs to eval for infection and alternate etiology. Educated patient on natural course of vaccine side effects. Educated patient on red flag symptoms to warrant return to clinic or emergency room visit. I have discussed the diagnosis with the patient and the intended plan as seen in the above orders. The patient has been offered or received an after-visit summary and questions were answered concerning future plans. I have discussed medication side effects and warnings with the patient as well. Follow-up and Dispositions    · Return if symptoms worsen or fail to improve.          Signed,    Shaquille Li MD  8/28/2019

## 2019-08-28 NOTE — PROGRESS NOTES
Chief Complaint   Patient presents with    Fever    Fatigue    Generalized Body Aches     1. Have you been to the ER, urgent care clinic since your last visit? Hospitalized since your last visit? No    2. Have you seen or consulted any other health care providers outside of the [address] [place] [address] since your last visit? Include any pap smears or colon screening. No       Patient presents in office with generalized body aches, fever, lethargy.  States it started yesterday after receiving pneumonia and flu vaccine

## 2019-08-28 NOTE — PATIENT INSTRUCTIONS
Your symptoms are consistent with side effect of the vaccinations he recently received. It is expected that you could feel some fatigue, muscle ache, weakness, fever, pain/swelling/redness at the site of your injection. For your symptoms:   · Use OTC Tylenol (up to 650mg every 6 hours) or Ibuprofen (up to 800 mg every 8 hours) as needed for pain, fever or headaches. Fatigue: Care Instructions  Your Care Instructions    Fatigue is a feeling of tiredness, exhaustion, or lack of energy. You may feel fatigue because of too much or not enough activity. It can also come from stress, lack of sleep, boredom, and poor diet. Many medical problems, such as viral infections, can cause fatigue. Emotional problems, especially depression, are often the cause of fatigue. Fatigue is most often a symptom of another problem. Treatment for fatigue depends on the cause. For example, if you have fatigue because you have a certain health problem, treating this problem also treats your fatigue. If depression or anxiety is the cause, treatment may help. Follow-up care is a key part of your treatment and safety. Be sure to make and go to all appointments, and call your doctor if you are having problems. It's also a good idea to know your test results and keep a list of the medicines you take. How can you care for yourself at home? · Get regular exercise. But don't overdo it. Go back and forth between rest and exercise. · Get plenty of rest.  · Eat a healthy diet. Do not skip meals, especially breakfast.  · Reduce your use of caffeine, tobacco, and alcohol. Caffeine is most often found in coffee, tea, cola drinks, and chocolate. · Limit medicines that can cause fatigue. This includes tranquilizers and cold and allergy medicines. When should you call for help?   Watch closely for changes in your health, and be sure to contact your doctor if:    · You have new symptoms such as fever or a rash.     · Your fatigue gets worse.     · You have been feeling down, depressed, or hopeless. Or you may have lost interest in things that you usually enjoy.     · You are not getting better as expected. Where can you learn more? Go to http://eren-jennifer.info/. Enter Z097 in the search box to learn more about \"Fatigue: Care Instructions. \"  Current as of: September 23, 2018  Content Version: 12.1  © 1362-9568 [employer]. Care instructions adapted under license by [employer] (which disclaims liability or warranty for this information). If you have questions about a medical condition or this instruction, always ask your healthcare professional. [place] [address] any warranty or liability for your use of this information. Influenza (Flu) Vaccine (Inactivated or Recombinant): What You Need to Know  Why get vaccinated? Influenza (\"flu\") is a contagious disease that spreads around the [facility] every winter, usually between October and May. Flu is caused by influenza viruses and is spread mainly by coughing, sneezing, and close contact. Anyone can get flu. Flu strikes suddenly and can last several days. Symptoms vary by age, but can include:  · Fever/chills. · Sore throat. · Muscle aches. · Fatigue. · Cough. · Headache. · Runny or stuffy nose. Flu can also lead to pneumonia and blood infections, and cause diarrhea and seizures in children. If you have a medical condition, such as heart or lung disease, flu can make it worse. Flu is more dangerous for some people. Infants and young children, people 72years of age and older, pregnant women, and people with certain health conditions or a weakened immune system are at greatest risk. Each year thousands of people in the [facility] die from flu, and many more are hospitalized. Flu vaccine can:  · Keep you from getting flu. · Make flu less severe if you do get it.   · Keep you from spreading flu to your family and other people. Inactivated and recombinant flu vaccines  A dose of flu vaccine is recommended every flu season. Children 6 months through 6years of age may need two doses during the same flu season. Everyone else needs only one dose each flu season. Some inactivated flu vaccines contain a very small amount of a mercury-based preservative called thimerosal. Studies have not shown thimerosal in vaccines to be harmful, but flu vaccines that do not contain thimerosal are available. There is no live flu virus in flu shots. They cannot cause the flu. There are many flu viruses, and they are always changing. Each year a new flu vaccine is made to protect against three or four viruses that are likely to cause disease in the upcoming flu season. But even when the vaccine doesn't exactly match these viruses, it may still provide some protection. Flu vaccine cannot prevent:  · Flu that is caused by a virus not covered by the vaccine. · Illnesses that look like flu but are not. Some people should not get this vaccine  Tell the person who is giving you the vaccine:  · If you have any severe (life-threatening) allergies. If you ever had a life-threatening allergic reaction after a dose of flu vaccine, or have a severe allergy to any part of this vaccine, you may be advised not to get vaccinated. Most, but not all, types of flu vaccine contain a small amount of egg protein. · If you ever had Guillain-Barré syndrome (also called GBS) Some people with a history of GBS should not get this vaccine. This should be discussed with your doctor. · If you are not feeling well. It is usually okay to get flu vaccine when you have a mild illness, but you might be asked to come back when you feel better. Risks of a vaccine reaction  With any medicine, including vaccines, there is a chance of reactions. These are usually mild and go away on their own, but serious reactions are also possible.   Most people who get a flu shot do not have any problems with it. Minor problems following a flu shot include:  · Soreness, redness, or swelling where the shot was given  · Hoarseness  · Sore, red or itchy eyes  · Cough  · Fever  · Aches  · Headache  · Itching  · Fatigue  If these problems occur, they usually begin soon after the shot and last 1 or 2 days. More serious problems following a flu shot can include the following:  · There may be a small increased risk of Guillain-Barré Syndrome (GBS) after inactivated flu vaccine. This risk has been estimated at 1 or 2 additional cases per million people vaccinated. This is much lower than the risk of severe complications from flu, which can be prevented by flu vaccine. · Gume Perfect children who get the flu shot along with pneumococcal vaccine (PCV13) and/or DTaP vaccine at the same time might be slightly more likely to have a seizure caused by fever. Ask your doctor for more information. Tell your doctor if a child who is getting flu vaccine has ever had a seizure  Problems that could happen after any injected vaccine:  · People sometimes faint after a medical procedure, including vaccination. Sitting or lying down for about 15 minutes can help prevent fainting, and injuries caused by a fall. Tell your doctor if you feel dizzy, or have vision changes or ringing in the ears. · Some people get severe pain in the shoulder and have difficulty moving the arm where a shot was given. This happens very rarely. · Any medication can cause a severe allergic reaction. Such reactions from a vaccine are very rare, estimated at about 1 in a million doses, and would happen within a few minutes to a few hours after the vaccination. As with any medicine, there is a very remote chance of a vaccine causing a serious injury or death. The safety of vaccines is always being monitored. For more information, visit: www.cdc.gov/vaccinesafety/. What if there is a serious reaction? What should I look for?   · Look for anything that concerns you, such as signs of a severe allergic reaction, very high fever, or unusual behavior. Signs of a severe allergic reaction can include hives, swelling of the face and throat, difficulty breathing, a fast heartbeat, dizziness, and weakness - usually within a few minutes to a few hours after the vaccination. What should I do? · If you think it is a severe allergic reaction or other emergency that can't wait, call 9-1-1 and get the person to the nearest hospital. Otherwise, call your doctor. · Reactions should be reported to the \"Vaccine Adverse Event Reporting System\" (VAERS). Your doctor should file this report, or you can do it yourself through the VAERS website at www.vaers. [facility].gov, or by calling 3-426.940.4528. VAERS does not give medical advice. The National Vaccine Injury Compensation Program  The National Vaccine Injury Compensation Program (VICP) is a federal program that was created to compensate people who may have been injured by certain vaccines. Persons who believe they may have been injured by a vaccine can learn about the program and about filing a claim by calling 0-225.731.4398 or visiting the [employer] website at www.[facility].gov/vaccinecompensation. There is a time limit to file a claim for compensation. How can I learn more? · Ask your healthcare provider. He or she can give you the vaccine package insert or suggest other sources of information. · Call your local or state health department. · Contact the Centers for Disease Control and Prevention (CDC):  ? Call 2-102.455.8024 (1-800-CDC-INFO) or  ? Visit CDC's website at www.cdc.gov/flu  Vaccine Information Statement  Inactivated Influenza Vaccine  8/7/2015)  [address] 396GL-51  Department of Health and Human Services  Centers for Disease Control and Prevention  Many Vaccine Information Statements are available in [place] and other languages. See www.immunize.org/vis.   Muchas hojas de información sobre vacunas están disponibles en español y en otros idiomas. Visite www.immunize.org/vis. Care instructions adapted under license by [employer] (which disclaims liability or warranty for this information). If you have questions about a medical condition or this instruction, always ask your healthcare professional. [address] any warranty or liability for your use of this information. Pneumococcal Polysaccharide Vaccine: What You Need to Know  Why get vaccinated? Vaccination can protect older adults (and some children and younger adults) from pneumococcal disease. Pneumococcal disease is caused by bacteria that can spread from person to person through close contact. It can cause ear infections, and it can also lead to more serious infections of the:  · Lungs (pneumonia),  · Blood (bacteremia), and  · Covering of the brain and spinal cord (meningitis). Meningitis can cause deafness and brain damage, and it can be fatal.  Anyone can get pneumococcal disease, but children under 3years of age, people with certain medical conditions, adults over 72years of age, and cigarette smokers are at the highest risk. About 18,000 older adults die each year from pneumococcal disease in the [place]. Treatment of pneumococcal infections with penicillin and other drugs used to be more effective. But some strains of the disease have become resistant to these drugs. This makes prevention of the disease, through vaccination, even more important. Pneumococcal polysaccharide vaccine (PPSV23)  Pneumococcal polysaccharide vaccine (PPSV23) protects against 23 types of pneumococcal bacteria. It will not prevent all pneumococcal disease.   PPSV23 is recommended for:  · All adults 72years of age and older,  · Anyone 2 through 59years of age with certain long-term health problems,  · Anyone 2 through 59years of age with a weakened immune system,  · Adults 23 through 59years of age who smoke cigarettes or have asthma. Most people need only one dose of PPSV. A second dose is recommended for certain high-risk groups. People 72 and older should get a dose even if they have gotten one or more doses of the vaccine before they turned 65. Your healthcare provider can give you more information about these recommendations. Most healthy adults develop protection within 2 to 3 weeks of getting the shot. Some people should not get this vaccine  · Anyone who has had a life-threatening allergic reaction to PPSV should not get another dose. · Anyone who has a severe allergy to any component of PPSV should not receive it. Tell your provider if you have any severe allergies. · Anyone who is moderately or severely ill when the shot is scheduled may be asked to wait until they recover before getting the vaccine. Someone with a mild illness can usually be vaccinated. · Children less than 3years of age should not receive this vaccine. · There is no evidence that PPSV is harmful to either a pregnant woman or to her fetus. However, as a precaution, women who need the vaccine should be vaccinated before becoming pregnant, if possible. Risks of a vaccine reaction  With any medicine, including vaccines, there is a chance of side effects. These are usually mild and go away on their own, but serious reactions are also possible. About half of people who get PPSV have mild side effects, such as redness or pain where the shot is given, which go away within about two days. Less than 1 out of 100 people develop a fever, muscle aches, or more severe local reactions. Problems that could happen after any vaccine:  · People sometimes faint after a medical procedure, including vaccination. Sitting or lying down for about 15 minutes can help prevent fainting, and injuries caused by a fall. Tell your doctor if you feel dizzy, or have vision changes or ringing in the ears.   · Some people get severe pain in the shoulder and have difficulty moving the arm where a shot was given. This happens very rarely. · Any medication can cause a severe allergic reaction. Such reactions from a vaccine are very rare, estimated at about 1 in a million doses, and would happen within a few minutes to a few hours after the vaccination. As with any medicine, there is a very remote chance of a vaccine causing a serious injury or death. The safety of vaccines is always being monitored. For more information, visit: www.cdc.gov/vaccinesafety/  What if there is a serious reaction? What should I look for? Look for anything that concerns you, such as signs of a severe allergic reaction, very high fever, or unusual behavior. Signs of a severe allergic reaction can include hives, swelling of the face and throat, difficulty breathing, a fast heartbeat, dizziness, and weakness. These would usually start a few minutes to a few hours after the vaccination. What should I do? If you think it is a severe allergic reaction or other emergency that can't wait, call 9-1-1 or get to the nearest hospital. Otherwise, call your doctor. Afterward, the reaction should be reported to the Vaccine Adverse Event Reporting System (VAERS). Your doctor might file this report, or you can do it yourself through the VAERS web site at www.vaers. hhs.gov, or by calling 0-983.596.3831. VAERS does not give medical advice. How can I learn more? · Ask your doctor. He or she can give you the vaccine package insert or suggest other sources of information. · Call your local or state health department. · Contact the Centers for Disease Control and Prevention (CDC):  ? Call 6-662.718.9390 (1-800-CDC-INFO) or  ? Visit CDC's website at www.cdc.gov/vaccines  Vaccine Information Statement  PPSV Vaccine  (04/24/2015)  Department of Health and Human Services  Centers for Disease Control and Prevention  Many Vaccine Information Statements are available in [place] and other languages. See www.immunize.org/vis.   Rozina pina información Sobre Vacunas están disponibles en español y en muchos otros idiomas. Visite Tori.si. Care instructions adapted under license by [employer] (which disclaims liability or warranty for this information). If you have questions about a medical condition or this instruction, always ask your healthcare professional. [address] any warranty or liability for your use of this information.

## 2019-08-29 LAB
BASOPHILS # BLD AUTO: 0 X10E3/UL (ref 0–0.2)
BASOPHILS NFR BLD AUTO: 0 %
BUN SERPL-MCNC: 13 MG/DL (ref 6–24)
BUN/CREAT SERPL: 16 (ref 9–20)
CHLORIDE SERPL-SCNC: 101 MMOL/L (ref 96–106)
CK SERPL-CCNC: 93 U/L (ref 24–204)
CO2 SERPL-SCNC: 23 MMOL/L (ref 20–29)
CREAT SERPL-MCNC: 0.82 MG/DL (ref 0.76–1.27)
EOSINOPHIL # BLD AUTO: 0 X10E3/UL (ref 0–0.4)
EOSINOPHIL NFR BLD AUTO: 1 %
ERYTHROCYTE [DISTWIDTH] IN BLOOD BY AUTOMATED COUNT: 13.7 % (ref 12.3–15.4)
GLUCOSE SERPL-MCNC: 181 MG/DL (ref 65–99)
HCT VFR BLD AUTO: 43.8 % (ref 37.5–51)
HGB BLD-MCNC: 15 G/DL (ref 13–17.7)
IMM GRANULOCYTES # BLD AUTO: 0 X10E3/UL (ref 0–0.1)
IMM GRANULOCYTES NFR BLD AUTO: 0 %
LYMPHOCYTES # BLD AUTO: 1.7 X10E3/UL (ref 0.7–3.1)
LYMPHOCYTES NFR BLD AUTO: 23 %
MCH RBC QN AUTO: 29.6 PG (ref 26.6–33)
MCHC RBC AUTO-ENTMCNC: 34.2 G/DL (ref 31.5–35.7)
MCV RBC AUTO: 86 FL (ref 79–97)
MONOCYTES # BLD AUTO: 0.6 X10E3/UL (ref 0.1–0.9)
MONOCYTES NFR BLD AUTO: 8 %
NEUTROPHILS # BLD AUTO: 5 X10E3/UL (ref 1.4–7)
NEUTROPHILS NFR BLD AUTO: 68 %
PLATELET # BLD AUTO: 171 X10E3/UL (ref 150–450)
POTASSIUM SERPL-SCNC: 4.2 MMOL/L (ref 3.5–5.2)
RBC # BLD AUTO: 5.07 X10E6/UL (ref 4.14–5.8)
SODIUM SERPL-SCNC: 139 MMOL/L (ref 134–144)
WBC # BLD AUTO: 7.4 X10E3/UL (ref 3.4–10.8)

## 2019-08-30 NOTE — PROGRESS NOTES
Notify Patient:  All of your test results look good.  Your muscle enzyme test is normal and you immune system numbers are normal.

## 2019-08-30 NOTE — PROGRESS NOTES
Outbound call to patient. Name and  verified. Reviewed recent labs with patient. He verbalized understanding. Stated he was feeling much better.  appreciative of call

## 2019-10-17 ENCOUNTER — OFFICE VISIT (OUTPATIENT)
Dept: FAMILY MEDICINE CLINIC | Age: 54
End: 2019-10-17

## 2019-10-17 VITALS
HEIGHT: 66 IN | SYSTOLIC BLOOD PRESSURE: 138 MMHG | HEART RATE: 79 BPM | DIASTOLIC BLOOD PRESSURE: 86 MMHG | TEMPERATURE: 97.7 F | OXYGEN SATURATION: 98 % | BODY MASS INDEX: 30.53 KG/M2 | RESPIRATION RATE: 18 BRPM | WEIGHT: 190 LBS

## 2019-10-17 DIAGNOSIS — Z20.5 EXPOSURE TO HEPATITIS: Primary | ICD-10-CM

## 2019-10-17 DIAGNOSIS — K21.9 GASTROESOPHAGEAL REFLUX DISEASE WITHOUT ESOPHAGITIS: ICD-10-CM

## 2019-10-17 DIAGNOSIS — R06.6 HICCUPS: ICD-10-CM

## 2019-10-17 DIAGNOSIS — E11.65 UNCONTROLLED TYPE 2 DIABETES MELLITUS WITH HYPERGLYCEMIA (HCC): ICD-10-CM

## 2019-10-17 RX ORDER — PHENOL/SODIUM PHENOLATE
20 AEROSOL, SPRAY (ML) MUCOUS MEMBRANE DAILY
Qty: 30 TAB | Refills: 0 | Status: SHIPPED | OUTPATIENT
Start: 2019-10-17 | End: 2019-11-16 | Stop reason: SDUPTHER

## 2019-10-17 NOTE — PROGRESS NOTES
Chief Complaint   Patient presents with    Diabetes     f/u from [address] [place] [address]      1. Have you been to the ER, urgent care clinic since your last visit? Hospitalized since your last visit? No    2. Have you seen or consulted any other health care providers outside of the [address] [place] [address] since your last visit? Include any pap smears or colon screening. No       Pt states that his insurance wont pay for viagra, so he would like another brand that his insurance may cover.     Pt states he has a fatty liver which I causing right sided/middle abdominal pain along with not using his cpap machine which is causing him to spit up blood (once)     Pt also c/o his left side becoming numb or painful when he sits or lays down flat for long periods of time

## 2019-10-17 NOTE — PROGRESS NOTES
Assessment/Plan:     Diagnoses and all orders for this visit:    1. Exposure to hepatitis  -     CHRONIC HEPATITIS PANEL    2. Uncontrolled type 2 diabetes mellitus with hyperglycemia (HCC)  -     METABOLIC PANEL, COMPREHENSIVE  -     HEMOGLOBIN A1C WITH EAG  -     LIPID PANEL  Labs pending. Treatment will be adjusted based on results. 3. Hiccups  Treatment as below. 4. Gastroesophageal reflux disease without esophagitis  -     Omeprazole delayed release (PRILOSEC D/R) 20 mg tablet; Take 1 Tab by mouth daily. Restart treatment as above. Continue to encourage weight loss. Other orders  -     COMMENT  -     CVD REPORT        Follow-up and Dispositions    · Return in about 3 months (around 1/17/2020) for Follow Up. Discussed expected course/resolution/complications of diagnosis in detail with patient. Medication risks/benefits/costs/interactions/alternatives discussed with patient. Pt was given after visit summary which includes diagnoses, current medications & vitals. Pt expressed understanding with the diagnosis and plan          Subjective:      Krunal Valdez is a 48 y.o. male who presents for had concerns including Diabetes (f/u from [address] [place] [address] ). Patient is a 48 y.o. male that comes today for follow up of Diabetes Mellitus Type 2. Patient does regularly monitor  their FSBG at home. The postprandial plasma glucose (fpg) usually runs around 180-200 mg/L. not attempting to follow a low fat, low cholesterol diet  Patients activity level: sedentery  there was no change in patient's weight. .  The patient has not experienced recent hypoglycemia. reports that he has never smoked.  He has never used smokeless tobacco.    Lab Results   Component Value Date/Time    Hemoglobin A1c 7.5 (H) 10/17/2019 04:22 PM    Hemoglobin A1c 7.1 (H) 11/18/2014 04:21 PM    Hemoglobin A1c 6.9 (H) 12/05/2013 11:05 AM     Needs testing for hepatitis as his wife has been treated for chronic B and C in past. His previous testing has been negative. Reports an increase in indigestion recently. Has attempted diet changes without improvement. Patient Active Problem List   Diagnosis Code    Uncontrolled type 2 diabetes circulatory disorder erectile dysfunction (HCC) E11.59, N52.1, E11.65    Sleep apnea, obstructive G47.33    TB lung, latent Z22.7    Insomnia G47.00    Mixed hyperlipidemia E78.2    Peripheral vascular disease ([facility] [address] I73.9    Uncontrolled type 2 diabetes mellitus with hyperglycemia ([facility] [address] E11.65    Obesity (BMI 30-39. 9) E66.9       Current Outpatient Medications   Medication Sig Dispense Refill    Omeprazole delayed release (PRILOSEC D/R) 20 mg tablet Take 1 Tab by mouth daily. 30 Tab 0    ibuprofen (MOTRIN) 600 mg tablet Take 1 Tab by mouth every eight (8) hours as needed for Pain. 30 Tab 0    atorvastatin (LIPITOR) 20 mg tablet Take 1 Tab by mouth daily. 30 Tab 3    sildenafil citrate (VIAGRA) 25 mg tablet Take 1 Tab by mouth as needed (ED). 10 Tab 4    glipiZIDE SR (GLUCOTROL XL) 5 mg CR tablet Take 1 Tab by mouth daily. In addition to 10mg tablet for a total daily dose of 15mg. 30 Tab 3    glipiZIDE SR (GLUCOTROL XL) 10 mg CR tablet Take 1 Tab by mouth daily. 90 Tab 1    metFORMIN (GLUCOPHAGE) 500 mg tablet Take 2 Tabs by mouth two (2) times daily (with meals). 360 Tab 3    Blood-Glucose Meter monitoring kit Use to measure blood sugar 2 time daily and as needed. 1 kit 0    Lancets misc Use to measure blood sugar 2 times daily and as needed 1 Package 11    glucose blood VI test strips (ASCENSIA AUTODISC VI, ONE TOUCH ULTRA TEST VI) strip Use to measure blood sugar 2 times daily and as needed 1 Package 11       No Known Allergies    ROS:   Review of Systems   Constitutional: Negative for malaise/fatigue. Eyes: Negative for blurred vision. Respiratory: Negative for shortness of breath. Cardiovascular: Negative for chest pain. Gastrointestinal: Positive for heartburn. Objective:     Visit Vitals  /86 (BP 1 Location: Left arm, BP Patient Position: Sitting)   Pulse 79   Temp 97.7 °F (36.5 °C) (Oral)   Resp 18   Ht 5' 6\" (1.676 m)   Wt 190 lb (86.2 kg)   SpO2 98%   BMI 30.67 kg/m²       Vitals and Nurse Documentation reviewed. Physical Exam   Constitutional: No distress. Cardiovascular: S1 normal and S2 normal. Exam reveals no gallop and no friction rub. No murmur heard. Pulmonary/Chest: Breath sounds normal. No respiratory distress. Abdominal: Soft. Bowel sounds are normal. He exhibits no distension and no mass. There is no tenderness. There is no rebound and no guarding. Skin: Skin is warm and dry.    Psychiatric: Mood and affect normal.

## 2019-10-19 LAB
ALBUMIN SERPL-MCNC: 4.5 G/DL (ref 3.5–5.5)
ALBUMIN/GLOB SERPL: 1.6 {RATIO} (ref 1.2–2.2)
ALP SERPL-CCNC: 70 IU/L (ref 39–117)
ALT SERPL-CCNC: 22 IU/L (ref 0–44)
AST SERPL-CCNC: 20 IU/L (ref 0–40)
BILIRUB SERPL-MCNC: 0.7 MG/DL (ref 0–1.2)
BUN SERPL-MCNC: 13 MG/DL (ref 6–24)
BUN/CREAT SERPL: 15 (ref 9–20)
CALCIUM SERPL-MCNC: 9.5 MG/DL (ref 8.7–10.2)
CHLORIDE SERPL-SCNC: 98 MMOL/L (ref 96–106)
CHOLEST SERPL-MCNC: 134 MG/DL (ref 100–199)
CO2 SERPL-SCNC: 25 MMOL/L (ref 20–29)
COMMENT, 144067: NORMAL
CREAT SERPL-MCNC: 0.86 MG/DL (ref 0.76–1.27)
EST. AVERAGE GLUCOSE BLD GHB EST-MCNC: 169 MG/DL
GLOBULIN SER CALC-MCNC: 2.8 G/DL (ref 1.5–4.5)
GLUCOSE SERPL-MCNC: 141 MG/DL (ref 65–99)
HBA1C MFR BLD: 7.5 % (ref 4.8–5.6)
HBV CORE AB SERPL QL IA: NEGATIVE
HBV CORE IGM SERPL QL IA: NEGATIVE
HBV E AB SERPL QL IA: NEGATIVE
HBV E AG SERPL QL IA: NEGATIVE
HBV SURFACE AB SER QL: REACTIVE
HBV SURFACE AG SERPL QL IA: NEGATIVE
HCV AB S/CO SERPL IA: 0.2 S/CO RATIO (ref 0–0.9)
HDLC SERPL-MCNC: 50 MG/DL
INTERPRETATION, 910389: NORMAL
LDLC SERPL CALC-MCNC: 46 MG/DL (ref 0–99)
POTASSIUM SERPL-SCNC: 4 MMOL/L (ref 3.5–5.2)
PROT SERPL-MCNC: 7.3 G/DL (ref 6–8.5)
SODIUM SERPL-SCNC: 138 MMOL/L (ref 134–144)
TRIGL SERPL-MCNC: 189 MG/DL (ref 0–149)
VLDLC SERPL CALC-MCNC: 38 MG/DL (ref 5–40)

## 2019-10-25 ENCOUNTER — TELEPHONE (OUTPATIENT)
Dept: FAMILY MEDICINE CLINIC | Age: 54
End: 2019-10-25

## 2019-10-25 NOTE — TELEPHONE ENCOUNTER
Chief Complaint   Patient presents with    Prior Auth     OMEPRAZOLE DR 20 MG TABLETS, TAKE ONE TABLET DAILY , 30 TABLETS NO REFILLS AS WRITTEN BY BERNADETTE FORTE NP. PRIOR AUTHORIZATION :  PENDING.   Elissa Vaca LPN

## 2019-11-26 ENCOUNTER — OFFICE VISIT (OUTPATIENT)
Dept: INTERNAL MEDICINE CLINIC | Age: 54
End: 2019-11-26

## 2019-11-26 VITALS
BODY MASS INDEX: 30.7 KG/M2 | DIASTOLIC BLOOD PRESSURE: 71 MMHG | OXYGEN SATURATION: 97 % | HEIGHT: 66 IN | HEART RATE: 82 BPM | RESPIRATION RATE: 16 BRPM | SYSTOLIC BLOOD PRESSURE: 108 MMHG | WEIGHT: 191 LBS

## 2019-11-26 DIAGNOSIS — M54.40 ACUTE MIDLINE LOW BACK PAIN WITH SCIATICA, SCIATICA LATERALITY UNSPECIFIED: Primary | ICD-10-CM

## 2019-11-26 DIAGNOSIS — M25.512 ACUTE PAIN OF LEFT SHOULDER: ICD-10-CM

## 2019-11-26 NOTE — PROGRESS NOTES
Chief Complaint   Patient presents with    Ankle Pain     he is a 48y.o. year old male who presents for evaluation of low back pain   Pain Assessment Encounter      Uche MADRID Wassef  11/26/2019  Onset of Symptoms: 20 years  ________________________________________________________________________  Description: sharp pains     Frequency: more than 5 times a day but comes and goes   Pain Scale:(1-10): 10  Trauma Hx: none  Hx of similar symptoms: Yes  Radiation: YES, foot, ankle and leg  Duration:  intermittent      Progression: has worsened  What makes it better?: OTC meds and rest  What makes it worse?:exercise, sitting and walking  Medications tried: acetaminophen, ibuprofen    Patient complains of left shoulder pain. Patient states that this been on for many years and states that mainly occurs when his arms are elevated. Patient states that his pain is mostly on the posterior part of the shoulder and feels deep in nature. Pain is about a 4 out of 10 with no radiation. Patient states he cannot press on it, he has had not had any imaging done on these. Reviewed and agree with Nurse Note and duplicated in this note. Reviewed PmHx, RxHx, FmHx, SocHx, AllgHx and updated and dated in the chart.     Family History   Problem Relation Age of Onset    Heart Disease Father        Past Medical History:   Diagnosis Date    Diabetes ([facility] [address]     Esophageal reflux     reported by patient has had 2 endoscopies in [facility] liver     reported by patient seen on u/s in [facility] in foot, right 10/23/2017    Hypertension     Sleep apnea, obstructive 4/2014    AHI 32.3, does not have machine      Social History     Socioeconomic History    Marital status:      Spouse name: Not on file    Number of children: Not on file    Years of education: Not on file    Highest education level: Not on file   Tobacco Use    Smoking status: Never Smoker    Smokeless tobacco: Never Used   Substance and Sexual Activity    Alcohol use: No    Drug use: No    Sexual activity: Yes     Partners: Female     Comment:         Review of Systems - negative except as listed above      Objective:     Vitals:    11/26/19 1539   BP: 108/71   Pulse: 82   Resp: 16   SpO2: 97%   Weight: 191 lb (86.6 kg)   Height: 5' 6\" (1.676 m)       Physical Examination: General appearance - alert, well appearing, and in no distress  Back exam - limited range of motion, pain with motion noted during exam, negative straight-leg raise bilaterally , normal reflexes and strength bilateral lower extremities, sensory exam intact bilateral lower extremities  Neurological - alert, oriented, normal speech, no focal findings or movement disorder noted  Musculoskeletal - The left shoulder is  normal to inspection. The patient has full range of motion  . The shoulderis not tender to palpation . Bicep tendon: non-tender  The NEER test is negative. The Keen test:is negative   The Cross over test:is  negative. The Empty Can test:is  negative. Stressed ext rotation is:  negative. Stressed int rotation: negative. The Apprehension Sign is negative. The Lift off test is:  negative   Examination reveals the Painful Arch:  negative. The Labral Test is:  negative. The Sulcus Sign is:  negative. Extremities - peripheral pulses normal, no pedal edema, no clubbing or cyanosis  Skin - normal coloration and turgor, no rashes, no suspicious skin lesions noted    Assessment/ Plan:   Diagnoses and all orders for this visit:    1.  Acute midline low back pain with sciatica, sciatica laterality unspecified  -     XR SPINE LUMB 2 OR 3 V; Future  -     REFERRAL TO PHYSICAL THERAPY    2. Acute pain of left shoulder  -     REFERRAL TO PHYSICAL THERAPY  -     XR SHOULDER LT AP/LAT MIN 2 V; Future    Shoulder pain likely rhomboid spasms with normal exam today    Pathophysiology, recovery and rehabilitation process discussed and questions answered Counseling for 30 Minutes of the total visit duration   Pictures and figures used as necessary   Provided reassurance   Monitor response to Physical Therapy   Recommend activity modification   Recommend  lower impact activities-walking, Eliptical, Nordic Track, cycling or swimming   Follow up in 4 week(s)     1) Remember to stay active and/or exercise regularly (I suggest 30-45 minutes daily)   2) For reliable dietary information, go to www. EATRIGHT.org. You may wish to consider seeing the nutritionist at [facility] [phone], also consider the [address] I have discussed the diagnosis with the patient and the intended plan as seen in the above orders. The patient has received an after-visit summary and questions were answered concerning future plans. Medication Side Effects and Warnings were discussed with patient,  Patient Labs were reviewed and or requested, and  Patient Past Records were reviewed and or requested  yes      Pt agrees to call or return to clinic and/or go to closest ER with any worsening of symptoms. This may include, but not limited to increased fever (>100.4) with NSAIDS or Tylenol, increased edema, confusion, rash, worsening of presenting symptoms.

## 2019-12-08 ENCOUNTER — HOSPITAL ENCOUNTER (EMERGENCY)
Age: 54
Discharge: HOME | End: 2019-12-08
Attending: EMERGENCY MEDICINE
Payer: MEDICAID

## 2019-12-08 VITALS
TEMPERATURE: 97.7 F | DIASTOLIC BLOOD PRESSURE: 85 MMHG | RESPIRATION RATE: 16 BRPM | OXYGEN SATURATION: 98 % | SYSTOLIC BLOOD PRESSURE: 128 MMHG

## 2019-12-08 DIAGNOSIS — H60.391 OTHER INFECTIVE ACUTE OTITIS EXTERNA OF RIGHT EAR: ICD-10-CM

## 2019-12-08 DIAGNOSIS — H92.01 EAR PAIN, RIGHT: Primary | ICD-10-CM

## 2019-12-08 PROCEDURE — 99282 EMERGENCY DEPT VISIT SF MDM: CPT

## 2019-12-08 RX ORDER — HYDROCODONE BITARTRATE AND ACETAMINOPHEN 5; 325 MG/1; MG/1
1 TABLET ORAL
Qty: 10 TAB | Refills: 0 | Status: SHIPPED | OUTPATIENT
Start: 2019-12-08 | End: 2019-12-13

## 2019-12-08 RX ORDER — NEOMYCIN SULFATE, POLYMYXIN B SULFATE AND HYDROCORTISONE 10; 3.5; 1 MG/ML; MG/ML; [USP'U]/ML
3 SUSPENSION/ DROPS AURICULAR (OTIC) 4 TIMES DAILY
Qty: 10 ML | Refills: 0 | Status: SHIPPED | OUTPATIENT
Start: 2019-12-08 | End: 2019-12-18

## 2019-12-08 NOTE — ED PROVIDER NOTES
HPI .  Patient has diabetes, hyperlipidemia, hypertension, hepatic steatosis, GERD, back pain, positive TB test, and obstructive sleep apnea. He says his CPAP mask is damaged and not working at this time. He has not been wearing it for several months. Patient presents with right ear pain worsening over the last 2 or 3 days. Patient reports that he has been prescribed Lyrica for his ear pain in the past and he took some old Lyrica without any relief over the last day or 2. He has not had a cough or rhinorrhea. His left ear does not hurt. He does not complain of decreased hearing or vertigo. Past Medical History:   Diagnosis Date    Diabetes ([facility] [address]     Esophageal reflux     reported by patient has had 2 endoscopies in [facility] liver     reported by patient seen on u/s in [facility] body in foot, right 10/23/2017    Hypertension     Sleep apnea, obstructive 4/2014    AHI 32.3, does not have machine       No past surgical history on file.       Family History:   Problem Relation Age of Onset    Heart Disease Father        Social History     Socioeconomic History    Marital status:      Spouse name: Not on file    Number of children: Not on file    Years of education: Not on file    Highest education level: Not on file   Occupational History    Not on file   Social Needs    Financial resource strain: Not on file    Food insecurity:     Worry: Not on file     Inability: Not on file    Transportation needs:     Medical: Not on file     Non-medical: Not on file   Tobacco Use    Smoking status: Never Smoker    Smokeless tobacco: Never Used   Substance and Sexual Activity    Alcohol use: No    Drug use: No    Sexual activity: Yes     Partners: Female     Comment:    Lifestyle    Physical activity:     Days per week: Not on file     Minutes per session: Not on file    Stress: Not on file   Relationships    Social connections:     Talks on phone: Not on file     Gets together: Not on file     Attends Hinduism service: Not on file     Active member of club or organization: Not on file     Attends meetings of clubs or organizations: Not on file     Relationship status: Not on file    Intimate partner violence:     Fear of current or ex partner: Not on file     Emotionally abused: Not on file     Physically abused: Not on file     Forced sexual activity: Not on file   Other Topics Concern    Not on file   Social History Narrative    Not on file         ALLERGIES: Patient has no known allergies. Review of Systems   HENT: Positive for ear pain. Negative for ear discharge and rhinorrhea. Eyes: Negative for redness. Respiratory: Negative for choking and chest tightness. Cardiovascular: Negative for chest pain and palpitations. Gastrointestinal: Negative for abdominal distention and anal bleeding. Genitourinary: Negative for difficulty urinating. Neurological: Negative for syncope. Psychiatric/Behavioral: Negative for agitation and behavioral problems. The patient is not nervous/anxious. Vitals:    12/08/19 0643   BP: 128/85   Resp: 16   Temp: 97.7 °F (36.5 °C)   SpO2: 98%            Physical Exam  Vitals signs and nursing note reviewed. Constitutional:       Appearance: He is well-developed. HENT:      Head: Normocephalic and atraumatic. Right Ear: Tympanic membrane and ear canal normal.      Left Ear: Tympanic membrane and ear canal normal.      Ears:      Comments: Right tragus is tender  Eyes:      Pupils: Pupils are equal, round, and reactive to light. Neck:      Musculoskeletal: Normal range of motion and neck supple. Cardiovascular:      Rate and Rhythm: Normal rate and regular rhythm. Heart sounds: Normal heart sounds. No murmur. No friction rub. No gallop. Pulmonary:      Effort: Pulmonary effort is normal. No respiratory distress. Breath sounds: No wheezing or rales. Abdominal:      Palpations: Abdomen is soft. Tenderness: There is no tenderness. There is no rebound. Musculoskeletal: Normal range of motion. General: No tenderness. Skin:     Findings: No erythema. Neurological:      Mental Status: He is alert. Cranial Nerves: No cranial nerve deficit.       Comments: Motor; symmetric   Psychiatric:         Behavior: Behavior normal.          MDM       Procedures

## 2019-12-08 NOTE — ED TRIAGE NOTES
Triage: Pt arrives from home with pain in his right ear. He reports the pain has been going on for a few days but worsened today. Pt says he took Lyrica for the pain yesterday and it helped. Denies fever and chills.

## 2019-12-08 NOTE — DISCHARGE INSTRUCTIONS
Patient Education        Earache: Care Instructions  Your Care Instructions    Even though infection is a common cause of ear pain, not all ear pain means an infection. If you have ear pain and don't have an infection, it could be because of a jaw problem, such as temporomandibular joint (TMJ) pain. Or it could be because of a neck problem. When ear discomfort or pain is mild or comes and goes without other symptoms, home treatment may be all you need. Follow-up care is a key part of your treatment and safety. Be sure to make and go to all appointments, and call your doctor if you are having problems. It's also a good idea to know your test results and keep a list of the medicines you take. How can you care for yourself at home? · Apply heat on the ear to ease pain. To apply heat, put a warm water bottle, a heating pad set on low, or a warm cloth on your ear. Do not go to sleep with a heating pad on your skin. · Take an over-the-counter pain medicine, such as acetaminophen (Tylenol), ibuprofen (Advil, Motrin), or naproxen (Aleve). Be safe with medicines. Read and follow all instructions on the label. · Do not take two or more pain medicines at the same time unless the doctor told you to. Many pain medicines have acetaminophen, which is Tylenol. Too much acetaminophen (Tylenol) can be harmful. · Never insert anything, such as a cotton swab or a yuki pin, into the ear. When should you call for help? Call your doctor now or seek immediate medical care if:    · You have new or worse symptoms of infection, such as:  ? Increased pain, swelling, warmth, or redness. ? Red streaks leading from the area. ? Pus draining from the area. ? A fever.    Watch closely for changes in your health, and be sure to contact your doctor if:    · You have new or worse discharge coming from the ear.     · You do not get better as expected. Where can you learn more? Go to http://eren-jennifer.info/.   Enter N528 in the search box to learn more about \"Earache: Care Instructions. \"  Current as of: October 21, 2018  Content Version: 12.2  © 8847-7420 [employer]. Care instructions adapted under license by [employer] (which disclaims liability or warranty for this information). If you have questions about a medical condition or this instruction, always ask your healthcare professional. [address]  any warranty or liability for your use of this information. Patient Education        Swimmer's Ear: Care Instructions  Your Care Instructions    Swimmer's ear (otitis externa) is inflammation or infection of the ear canal. This is the passage that leads from the outer ear to the eardrum. Any water, sand, or other debris that gets into the ear canal and stays there can cause swimmer's ear. Putting cotton swabs or other items in the ear to clean it can also cause this problem. Swimmer's ear can be very painful. But you can treat the pain and infection with medicines. You should feel better in a few days. Follow-up care is a key part of your treatment and safety. Be sure to make and go to all appointments, and call your doctor if you are having problems. It's also a good idea to know your test results and keep a list of the medicines you take. How can you care for yourself at home? Cleaning and care  · Use antibiotic drops as your doctor directs. · Do not insert ear drops (other than the antibiotic ear drops) or anything else into the ear unless your doctor has told you to. · Avoid getting water in the ear until the problem clears up. Use cotton lightly coated with petroleum jelly as an earplug. Do not use plastic earplugs. · Use a hair dryer set on low to carefully dry the ear after you shower. · To ease ear pain, hold a warm washcloth against your ear. · Take pain medicines exactly as directed.   ? If the doctor gave you a prescription medicine for pain, take it as prescribed. ? If you are not taking a prescription pain medicine, ask your doctor if you can take an over-the-counter medicine. Inserting ear drops  · Warm the drops to body temperature by rolling the container in your hands. Or you can place it in a cup of warm water for a few minutes. · Lie down, with your ear facing up. · Place drops inside the ear. Follow your doctor's instructions (or the directions on the label) for how many drops to use. Gently wiggle the outer ear or pull the ear up and back to help the drops get into the ear. · It's important to keep the liquid in the ear canal for 3 to 5 minutes. When should you call for help? Call your doctor now or seek immediate medical care if:    · You have a new or higher fever.     · You have new or worse pain, swelling, warmth, or redness around or behind your ear.     · You have new or increasing pus or blood draining from your ear.    Watch closely for changes in your health, and be sure to contact your doctor if:    · You are not getting better after 2 days (48 hours). Where can you learn more? Go to http://eren-jennifer.info/. Enter C706 in the search box to learn more about \"Swimmer's Ear: Care Instructions. \"  Current as of: October 21, 2018  Content Version: 12.2  © 6396-2383 [employer]. Care instructions adapted under license by [employer] (which disclaims liability or warranty for this information). If you have questions about a medical condition or this instruction, always ask your healthcare professional. [place] [address] any warranty or liability for your use of this information.

## 2019-12-22 DIAGNOSIS — E78.2 MIXED HYPERLIPIDEMIA: ICD-10-CM

## 2019-12-22 DIAGNOSIS — E11.65 UNCONTROLLED TYPE 2 DIABETES MELLITUS WITH HYPERGLYCEMIA (HCC): ICD-10-CM

## 2019-12-23 RX ORDER — ATORVASTATIN CALCIUM 20 MG/1
TABLET, FILM COATED ORAL
Qty: 30 TAB | Refills: 3 | Status: SHIPPED | OUTPATIENT
Start: 2019-12-23 | End: 2020-05-07 | Stop reason: SDUPTHER

## 2019-12-23 RX ORDER — GLIPIZIDE 5 MG/1
TABLET, FILM COATED, EXTENDED RELEASE ORAL
Qty: 30 TAB | Refills: 3 | Status: SHIPPED | OUTPATIENT
Start: 2019-12-23 | End: 2020-11-19 | Stop reason: SDUPTHER

## 2020-02-17 ENCOUNTER — OFFICE VISIT (OUTPATIENT)
Dept: FAMILY MEDICINE CLINIC | Age: 55
End: 2020-02-17

## 2020-02-17 VITALS
OXYGEN SATURATION: 97 % | BODY MASS INDEX: 30.95 KG/M2 | WEIGHT: 192.6 LBS | HEART RATE: 91 BPM | DIASTOLIC BLOOD PRESSURE: 77 MMHG | RESPIRATION RATE: 16 BRPM | SYSTOLIC BLOOD PRESSURE: 128 MMHG | HEIGHT: 66 IN | TEMPERATURE: 98.9 F

## 2020-02-17 DIAGNOSIS — M54.16 LUMBAR BACK PAIN WITH RADICULOPATHY AFFECTING LOWER EXTREMITY: Primary | ICD-10-CM

## 2020-02-17 RX ORDER — DICLOFENAC SODIUM 50 MG/1
50 TABLET, DELAYED RELEASE ORAL 2 TIMES DAILY
Qty: 30 TAB | Refills: 0 | Status: SHIPPED | OUTPATIENT
Start: 2020-02-17 | End: 2020-03-03

## 2020-02-17 NOTE — PROGRESS NOTES
Chief Complaint   Patient presents with    LOW BACK PAIN     x 4 days     Leg Pain     left      1. Have you been to the ER, urgent care clinic since your last visit? Hospitalized since your last visit? No    2. Have you seen or consulted any other health care providers outside of the [address] [place] [address] since your last visit? Include any pap smears or colon screening.  No

## 2020-02-17 NOTE — PROGRESS NOTES
Assessment/Plan:     Diagnoses and all orders for this visit:    1. Lumbar back pain with radiculopathy affecting lower extremity  -     diclofenac EC (VOLTAREN) 50 mg EC tablet; Take 1 Tab by mouth two (2) times a day. Indications: Back Pain  -     REFERRAL TO PAIN MANAGEMENT      Treatment as above. Referral for additional evaluation. Follow-up and Dispositions    · Return in about 2 months (around 4/17/2020) for Follow Up. Discussed expected course/resolution/complications of diagnosis in detail with patient. Medication risks/benefits/costs/interactions/alternatives discussed with patient. Pt was given after visit summary which includes diagnoses, current medications & vitals. Pt expressed understanding with the diagnosis and plan          Subjective:      Horacio Araujo is a 48 y.o. male who presents for had concerns including LOW BACK PAIN (x 4 days ) and Leg Pain (left ). Reports a several month history of lumbar back pain with left leg radiculopathy. Previous referral to PT but did not follow up. Affecting his activity level which has driven his blood sugars up. Has not attempted otc treatment. Symptoms are stable over time. Patient Active Problem List   Diagnosis Code    Uncontrolled type 2 diabetes circulatory disorder erectile dysfunction (HCC) E11.59, N52.1, E11.65    Sleep apnea, obstructive G47.33    TB lung, latent Z22.7    Insomnia G47.00    Mixed hyperlipidemia E78.2    Peripheral vascular disease ([facility] [address] I73.9    Uncontrolled type 2 diabetes mellitus with hyperglycemia ([facility] [address] E11.65    Obesity (BMI 30-39. 9) E66.9       Current Outpatient Medications   Medication Sig Dispense Refill    diclofenac EC (VOLTAREN) 50 mg EC tablet Take 1 Tab by mouth two (2) times a day.  Indications: Back Pain 30 Tab 0    atorvastatin (LIPITOR) 20 mg tablet TAKE 1 TABLET BY MOUTH EVERY DAY 30 Tab 3    glipiZIDE SR (GLUCOTROL XL) 5 mg CR tablet TAKE 1 TABLET BY MOUTH EVERY DAY IN ADDITION TO 10MG TABLET FOR TOTAL DAILY DOSE OF 15MG 30 Tab 3    Omeprazole delayed release (PRILOSEC D/R) 20 mg tablet TAKE 1 TABLET BY MOUTH EVERY DAY 30 Tab 3    sildenafil citrate (VIAGRA) 25 mg tablet Take 1 Tab by mouth as needed (ED). 10 Tab 4    glipiZIDE SR (GLUCOTROL XL) 10 mg CR tablet Take 1 Tab by mouth daily. 90 Tab 1    metFORMIN (GLUCOPHAGE) 500 mg tablet Take 2 Tabs by mouth two (2) times daily (with meals). 360 Tab 3    Blood-Glucose Meter monitoring kit Use to measure blood sugar 2 time daily and as needed. 1 kit 0    Lancets misc Use to measure blood sugar 2 times daily and as needed 1 Package 11    glucose blood VI test strips (ASCENSIA AUTODISC VI, ONE TOUCH ULTRA TEST VI) strip Use to measure blood sugar 2 times daily and as needed 1 Package 11       No Known Allergies    ROS:   Review of Systems   Constitutional: Negative for malaise/fatigue. Eyes: Negative for blurred vision. Respiratory: Negative for shortness of breath. Cardiovascular: Negative for chest pain. Musculoskeletal: Positive for back pain. Objective:     Visit Vitals  /77   Pulse 91   Temp 98.9 °F (37.2 °C) (Oral)   Resp 16   Ht 5' 6\" (1.676 m)   Wt 192 lb 9.6 oz (87.4 kg)   SpO2 97%   BMI 31.09 kg/m²       Vitals and Nurse Documentation reviewed. Physical Exam  Constitutional:       General: He is not in acute distress. Cardiovascular:      Heart sounds: S1 normal and S2 normal. No murmur. No friction rub. No gallop. Pulmonary:      Effort: No respiratory distress. Breath sounds: Normal breath sounds. Musculoskeletal:      Lumbar back: He exhibits pain and spasm. Back:    Skin:     General: Skin is warm and dry.    Psychiatric:         Mood and Affect: Mood and affect normal. no

## 2020-03-02 DIAGNOSIS — M54.16 LUMBAR BACK PAIN WITH RADICULOPATHY AFFECTING LOWER EXTREMITY: ICD-10-CM

## 2020-03-03 RX ORDER — DICLOFENAC SODIUM 50 MG/1
TABLET, DELAYED RELEASE ORAL
Qty: 30 TAB | Refills: 0 | Status: SHIPPED | OUTPATIENT
Start: 2020-03-03 | End: 2020-03-20

## 2020-03-19 DIAGNOSIS — M54.16 LUMBAR BACK PAIN WITH RADICULOPATHY AFFECTING LOWER EXTREMITY: ICD-10-CM

## 2020-03-20 DIAGNOSIS — E11.00 TYPE 2 DIABETES MELLITUS WITH HYPEROSMOLARITY WITHOUT COMA, UNSPECIFIED WHETHER LONG TERM INSULIN USE (HCC): ICD-10-CM

## 2020-03-20 RX ORDER — METFORMIN HYDROCHLORIDE 500 MG/1
1000 TABLET ORAL 2 TIMES DAILY WITH MEALS
Qty: 360 TAB | Refills: 3 | Status: SHIPPED | OUTPATIENT
Start: 2020-03-20 | End: 2021-02-26 | Stop reason: ALTCHOICE

## 2020-03-20 RX ORDER — DICLOFENAC SODIUM 50 MG/1
TABLET, DELAYED RELEASE ORAL
Qty: 30 TAB | Refills: 0 | Status: SHIPPED | OUTPATIENT
Start: 2020-03-20 | End: 2020-04-10

## 2020-03-20 NOTE — TELEPHONE ENCOUNTER
Patient came in person asking for refills on his medication, said that he had previously called and went to the pharmacy and was told nothing was done. TRENT Hayden spoke with Frank Santoyo and she sent Antonia Dickson a message. Please call the patient when it has been sent to the pharmacy when it is done, He wanted at least 2 to 3 month supply if possible. Thank you!

## 2020-03-25 RX ORDER — GLIPIZIDE 10 MG/1
TABLET, FILM COATED, EXTENDED RELEASE ORAL
Qty: 30 TAB | Refills: 5 | Status: SHIPPED | OUTPATIENT
Start: 2020-03-25 | End: 2020-11-19

## 2020-05-07 DIAGNOSIS — E78.2 MIXED HYPERLIPIDEMIA: ICD-10-CM

## 2020-05-07 RX ORDER — ATORVASTATIN CALCIUM 20 MG/1
20 TABLET, FILM COATED ORAL DAILY
Qty: 30 TAB | Refills: 3 | Status: SHIPPED | OUTPATIENT
Start: 2020-05-07 | End: 2020-09-15

## 2020-05-07 NOTE — TELEPHONE ENCOUNTER
Last Visit: 2/17/20  NP Katty  Next Appointment: Not scheduled  Previous Refill Encounter(s): 12/23/19  30 + 3 refills    Requested Prescriptions     Pending Prescriptions Disp Refills    atorvastatin (LIPITOR) 20 mg tablet 30 Tab 3     Sig: Take 1 Tab by mouth daily.

## 2020-05-08 DIAGNOSIS — E78.2 MIXED HYPERLIPIDEMIA: ICD-10-CM

## 2020-05-08 RX ORDER — ATORVASTATIN CALCIUM 20 MG/1
20 TABLET, FILM COATED ORAL DAILY
Qty: 30 TAB | Refills: 3 | Status: CANCELLED | OUTPATIENT
Start: 2020-05-08

## 2020-05-18 DIAGNOSIS — M54.16 LUMBAR BACK PAIN WITH RADICULOPATHY AFFECTING LOWER EXTREMITY: ICD-10-CM

## 2020-05-18 RX ORDER — DICLOFENAC SODIUM 50 MG/1
TABLET, DELAYED RELEASE ORAL
Qty: 60 TAB | Refills: 0 | Status: SHIPPED | OUTPATIENT
Start: 2020-05-18 | End: 2020-05-20 | Stop reason: DRUGHIGH

## 2020-05-20 ENCOUNTER — VIRTUAL VISIT (OUTPATIENT)
Dept: FAMILY MEDICINE CLINIC | Age: 55
End: 2020-05-20

## 2020-05-20 ENCOUNTER — TELEPHONE (OUTPATIENT)
Dept: FAMILY MEDICINE CLINIC | Age: 55
End: 2020-05-20

## 2020-05-20 DIAGNOSIS — M72.2 PLANTAR FASCIITIS OF LEFT FOOT: Primary | ICD-10-CM

## 2020-05-20 RX ORDER — DICLOFENAC SODIUM 75 MG/1
75 TABLET, DELAYED RELEASE ORAL 2 TIMES DAILY WITH MEALS
Qty: 60 TAB | Refills: 1 | Status: SHIPPED | OUTPATIENT
Start: 2020-05-20 | End: 2020-07-26

## 2020-05-20 NOTE — PROGRESS NOTES
VIRTUAL VISIT    Chief Complaint   Patient presents with    Foot Pain     Left Heel Pain x 4 months       HISTORY OF PRESENT ILLNESS   HPI  Patient seen via virtual visit and assisted by his son for partial language barrier. Left plantar foot and heel pain x 4 months. He was walking regularly for exercise up until then but has been more sedentary due to work the past several months. Area feels like an aching, burning sensation. Tightness. Hurts to walk on it. Now feels sore all the time. When putting pressure on left foot/heel feels like a \"heavy pressure or padding\" there. He has been taking Advil 1-2 tabs a day prn. He tried taking an old rx for Diclofenac 50 mg a few times which was more effective than the Advil but not helping as much now. He has not been icing or stretching the area. He denies swelling, discoloration, numbness or tingling. He is diabetic, compliant w/ his medications and checks his BS's several times a week. Fasting in AM ranges 120-130, 2 hrs after meals runs 150-170. REVIEW OF SYMPTOMS   Review of Systems   Constitutional: Negative. Cardiovascular: Negative for leg swelling. PROBLEM LIST/MEDICAL HISTORY     Problem List  Date Reviewed: 5/20/2020          Codes Class Noted    Esophageal reflux ICD-10-CM: K21.9  ICD-9-CM: 530.81  Unknown    Overview Signed 5/20/2020  8:42 AM by Mariano Modi MD     reported by patient has had 2 endoscopies in [facility]             Fatty liver ICD-10-CM: K76.0  ICD-9-CM: 571.8  Unknown    Overview Signed 5/20/2020  8:42 AM by Mariano Modi MD     reported by patient seen on u/s in [facility]             Hypertension ICD-10-CM: I10  ICD-9-CM: 401.9  Unknown        Peripheral vascular disease ([facility] ICD-10-CM: I73.9  ICD-9-CM: 443.9  8/27/2019        Uncontrolled type 2 diabetes mellitus with hyperglycemia ([facility] ICD-10-CM: E11.65  ICD-9-CM: 250.02  8/27/2019        Obesity (BMI 30-39. 9) ICD-10-CM: E66.9  ICD-9-CM: 278.00 8/27/2019        Insomnia ICD-10-CM: G47.00  ICD-9-CM: 780.52  3/2/2015        Mixed hyperlipidemia ICD-10-CM: E78.2  ICD-9-CM: 272.2  3/2/2015        TB lung, latent ICD-10-CM: Z22.7  ICD-9-CM: 795.51  1/5/2015        Uncontrolled type 2 diabetes circulatory disorder erectile dysfunction [facility]) ICD-10-CM: E11.59, N52.1, E11.65  ICD-9-CM: 250.72, 607.84, 443.81  11/5/2014        Sleep apnea, obstructive ICD-10-CM: G47.33  ICD-9-CM: 327.23  11/5/2014                  PAST SURGICAL HISTORY   History reviewed. No pertinent surgical history. MEDICATIONS     Current Outpatient Medications   Medication Sig    diclofenac EC (VOLTAREN) 50 mg EC tablet TAKE 1 TABLET BY MOUTH TWICE A DAY    atorvastatin (LIPITOR) 20 mg tablet Take 1 Tab by mouth daily.  glipiZIDE SR (GLUCOTROL XL) 10 mg CR tablet TAKE 1 TABLET BY MOUTH EVERY DAY    metFORMIN (GLUCOPHAGE) 500 mg tablet Take 2 Tabs by mouth two (2) times daily (with meals).  glipiZIDE SR (GLUCOTROL XL) 5 mg CR tablet TAKE 1 TABLET BY MOUTH EVERY DAY IN ADDITION TO 10MG TABLET FOR TOTAL DAILY DOSE OF 15MG    Omeprazole delayed release (PRILOSEC D/R) 20 mg tablet TAKE 1 TABLET BY MOUTH EVERY DAY    sildenafil citrate (VIAGRA) 25 mg tablet Take 1 Tab by mouth as needed (ED).  Blood-Glucose Meter monitoring kit Use to measure blood sugar 2 time daily and as needed.  Lancets misc Use to measure blood sugar 2 times daily and as needed    glucose blood VI test strips (ASCENSIA AUTODISC VI, ONE TOUCH ULTRA TEST VI) strip Use to measure blood sugar 2 times daily and as needed     No current facility-administered medications for this visit.            ALLERGIES   No Known Allergies       SOCIAL HISTORY     Social History     Socioeconomic History    Marital status:      Spouse name: Not on file    Number of children: Not on file    Years of education: Not on file    Highest education level: Not on file   Occupational History    Occupation: Accounting Tobacco Use    Smoking status: Never Smoker    Smokeless tobacco: Never Used   Substance and Sexual Activity    Alcohol use: No    Drug use: No    Sexual activity: Yes     Partners: Female     Comment:         IMMUNIZATIONS  Immunization History   Administered Date(s) Administered    Influenza Vaccine (Quad) PF 11/18/2014, 08/27/2019    MMR 11/18/2014    Pneumococcal Polysaccharide (PPSV-23) 08/27/2019    TB Skin Test (PPD) Intradermal 11/18/2014    Tdap 11/18/2014         FAMILY HISTORY     Family History   Problem Relation Age of Onset    Heart Disease Father          VITALS   There were no vitals taken for this visit. Virtual Visit       PHYSICAL EXAMINATION   Physical Exam  Constitutional:       General: He is not in acute distress. Appearance: Normal appearance. Pulmonary:      Effort: Pulmonary effort is normal. No respiratory distress. Feet:      Left foot:      Skin integrity: Skin integrity normal. No skin breakdown. Comments: No edema of left foot or ankle. ROM intact. Gait intact.              DIAGNOSTIC DATA         LABORATORY DATA     Results for orders placed or performed in visit on 10/17/19   CHRONIC HEPATITIS PANEL   Result Value Ref Range    Hep B surface Ag screen Negative Negative    Hepatitis Be Antigen Negative Negative    Hep B Core Ab, IgM Negative Negative    Hep B Core Ab, total Negative Negative    Hepatitis Be Antibody Negative Negative    HEP B SURFACE AB, QUAL Reactive     HCV Ab 0.2 0.0 - 0.9 s/co ratio   METABOLIC PANEL, COMPREHENSIVE   Result Value Ref Range    Glucose 141 (H) 65 - 99 mg/dL    BUN 13 6 - 24 mg/dL    Creatinine 0.86 0.76 - 1.27 mg/dL    GFR est non- >59 mL/min/1.73    GFR est  >59 mL/min/1.73    BUN/Creatinine ratio 15 9 - 20    Sodium 138 134 - 144 mmol/L    Potassium 4.0 3.5 - 5.2 mmol/L    Chloride 98 96 - 106 mmol/L    CO2 25 20 - 29 mmol/L    Calcium 9.5 8.7 - 10.2 mg/dL    Protein, total 7.3 6.0 - 8.5 g/dL    Albumin 4.5 3.5 - 5.5 g/dL    GLOBULIN, TOTAL 2.8 1.5 - 4.5 g/dL    A-G Ratio 1.6 1.2 - 2.2    Bilirubin, total 0.7 0.0 - 1.2 mg/dL    Alk. phosphatase 70 39 - 117 IU/L    AST (SGOT) 20 0 - 40 IU/L    ALT (SGPT) 22 0 - 44 IU/L   HEMOGLOBIN A1C WITH EAG   Result Value Ref Range    Hemoglobin A1c 7.5 (H) 4.8 - 5.6 %    Estimated average glucose 169 mg/dL   LIPID PANEL   Result Value Ref Range    Cholesterol, total 134 100 - 199 mg/dL    Triglyceride 189 (H) 0 - 149 mg/dL    HDL Cholesterol 50 >39 mg/dL    VLDL, calculated 38 5 - 40 mg/dL    LDL, calculated 46 0 - 99 mg/dL   COMMENT   Result Value Ref Range    Comment Comment    CVD REPORT   Result Value Ref Range    INTERPRETATION Note        Lab Results   Component Value Date/Time    Hemoglobin A1c 7.5 (H) 10/17/2019 04:22 PM    Hemoglobin A1c 7.1 (H) 11/18/2014 04:21 PM    Hemoglobin A1c 6.9 (H) 12/05/2013 11:05 AM    Glucose 141 (H) 10/17/2019 04:22 PM    Glucose (POC) 227 (H) 05/30/2019 08:43 PM    Glucose (POC) 210 (H) 05/30/2019 08:31 PM    Glucose  08/27/2019 08:53 AM    Microalb/Creat ratio (ug/mg creat.) 7.2 07/01/2019 09:48 AM    LDL, calculated 46 10/17/2019 04:22 PM    Creatinine (POC) 0.8 05/30/2019 08:43 PM    Creatinine 0.86 10/17/2019 04:22 PM          ASSESSMENT & PLAN       ICD-10-CM ICD-9-CM    1. Plantar fasciitis of left foot M72.2 728.71 REFERRAL TO PODIATRY      diclofenac EC (VOLTAREN) 75 mg EC tablet     Heel pad/cup.  Ice x 20 minutes 3-4 x a day  Plantar heel stretching exercises   Plantar fasciitis, home care instructions, exercises and counseling w/ pt and son today  Advised this and additional information will also be shared via AVS in his patient portal using MyChart  Diclofenac 75 mg bid w/ food prn as directed and resume his Prilosec 20 mg qam for GI protection in light of h/o GERD  Reviewed medications, effects, risks/benefits, precautions and potential side effects in detail  If symptoms persist beyond expectant course, follow up w/ Podiatry or sooner if anything worsens in the interim    ----------------------------------------------------------------------------------------------------------------------------------------------------------  Patient is being evaluated by a video visit encounter for concerns as above. A caregiver was present when appropriate. Due to this being a TeleHealth encounter (During [facility] public health emergency), evaluation of the following organ systems was limited: Vitals/Constitutional/EENT/Resp/CV/GI//MS/Neuro/Skin/Heme-Lymph-Imm. Pursuant to the emergency declaration under the [address] [place] [address], 1135 waiver authority and the [employer] Act, this Virtual  Visit was conducted, with patient's (and/or legal guardian's) consent, to reduce the patient's risk of exposure to COVID-19 and provide necessary medical care. Services were provided through a video synchronous discussion virtually to substitute for in-person clinic visit. Patient was located at their own home. I was at home while conducting this encounter. Consent:  He and/or his healthcare decision maker is aware that this patient-initiated Telehealth encounter is a billable service, with coverage as determined by his insurance carrier. He is aware that he may receive a bill and has provided verbal consent to proceed: Yes  This virtual visit was conducted via [employer] me. Pursuant to the emergency declaration under the [facility] [place], [address]5 waiver authority and the [employer] and [employer] Act, this Virtual  Visit was conducted to reduce the patient's risk of exposure to COVID-19 and provide continuity of care for an established patient. Services were provided through a video synchronous discussion virtually to substitute for in-person clinic visit.   Due to this being a TeleHealth evaluation, many elements of the physical examination are unable to be assessed. Total Time: minutes: 21-30 minutes.

## 2020-05-20 NOTE — TELEPHONE ENCOUNTER
Outbound call to patient. Patient states that he got locked out of his my chart account, and would like the provider to email him his visit summary. Advised patient that we would not be able to email the summary but can give my chart number help desk number for assistance. Understanding verbalized. Help desk number given.

## 2020-05-20 NOTE — PATIENT INSTRUCTIONS
Plantar Fasciitis: Care Instructions Your Care Instructions Plantar fasciitis is pain and inflammation of the plantar fascia, the tissue at the bottom of your foot that connects the heel bone to the toes. The plantar fascia also supports the arch. If you strain the plantar fascia, it can develop small tears and cause heel pain when you stand or walk. Plantar fasciitis can be caused by running or other sports. It also may occur in people who are overweight or who have high arches or flat feet. You may get plantar fasciitis if you walk or stand for long periods, or have a tight Achilles tendon or calf muscles. You can improve your foot pain with rest and other care at home. It might take a few weeks to a few months for your foot to heal completely. Follow-up care is a key part of your treatment and safety. Be sure to make and go to all appointments, and call your doctor if you are having problems. It's also a good idea to know your test results and keep a list of the medicines you take. How can you care for yourself at home? · Rest your feet often. Reduce your activity to a level that lets you avoid pain. If possible, do not run or walk on hard surfaces. · Take pain medicines exactly as directed. ? If the doctor gave you a prescription medicine for pain, take it as prescribed. ? If you are not taking a prescription pain medicine, take an over-the-counter anti-inflammatory medicine for pain and swelling, such as ibuprofen (Advil, Motrin) or naproxen (Aleve). Read and follow all instructions on the label. · Use ice massage to help with pain and swelling. You can use an ice cube or an ice cup several times a day. To make an ice cup, fill a paper cup with water and freeze it. Cut off the top of the cup until a half-inch of ice shows. Hold onto the remaining paper to use the cup. Rub the ice in small circles over the area for 5 to 7 minutes. · Contrast baths, which alternate hot and cold water, can also help reduce swelling. But because heat alone may make pain and swelling worse, end a contrast bath with a soak in cold water. · Wear a night splint if your doctor suggests it. A night splint holds your foot with the toes pointed up and the foot and ankle at a 90-degree angle. This position gives the bottom of your foot a constant, gentle stretch. · Do simple exercises such as calf stretches and towel stretches 2 to 3 times each day, especially when you first get up in the morning. These can help the plantar fascia become more flexible. They also make the muscles that support your arch stronger. Hold these stretches for 15 to 30 seconds per stretch. Repeat 2 to 4 times. ? Stand about 1 foot from a wall. Place the palms of both hands against the wall at chest level. Lean forward against the wall, keeping one leg with the knee straight and heel on the ground while bending the knee of the other leg. 
? Sit down on the floor or a mat with your feet stretched in front of you. Roll up a towel lengthwise, and loop it over the ball of your foot. Holding the towel at both ends, gently pull the towel toward you to stretch your foot. · Wear shoes with good arch support. Athletic shoes or shoes with a well-cushioned sole are good choices. · Try heel cups or shoe inserts (orthotics) to help cushion your heel. You can buy these at many shoe stores. · Put on your shoes as soon as you get out of bed. Going barefoot or wearing slippers may make your pain worse. · Reach and stay at a good weight for your height. This puts less strain on your feet. When should you call for help? Call your doctor now or seek immediate medical care if: 
  · You have heel pain with fever, redness, or warmth in your heel.  
  · You cannot put weight on the sore foot.  
 Watch closely for changes in your health, and be sure to contact your doctor if:   · You have numbness or tingling in your heel.  
  · Your heel pain lasts more than 2 weeks. Where can you learn more? Go to http://eren-jennifer.info/ Elgin Benito in the search box to learn more about \"Plantar Fasciitis: Care Instructions. \" Current as of: June 26, 2019Content Version: 12.4 © 9575-7323 [employer]. Care instructions adapted under license by [employer] (which disclaims liability or warranty for this information). If you have questions about a medical condition or this instruction, always ask your healthcare professional. [place] [address] any warranty or liability for your use of this information. Plantar Fasciitis: Exercises Introduction Here are some examples of exercises for you to try. The exercises may be suggested for a condition or for rehabilitation. Start each exercise slowly. Ease off the exercises if you start to have pain. You will be told when to start these exercises and which ones will work best for you. How to do the exercises Towel stretch 1. Sit with your legs extended and knees straight. 2. Place a towel around your foot just under the toes. 3. Hold each end of the towel in each hand, with your hands above your knees. 4. Pull back with the towel so that your foot stretches toward you. 5. Hold the position for at least 15 to 30 seconds. 6. Repeat 2 to 4 times a session, up to 5 sessions a day. Calf stretch 1. Stand facing a wall with your hands on the wall at about eye level. Put the leg you want to stretch about a step behind your other leg. 2. Keeping your back heel on the floor, bend your front knee until you feel a stretch in the back leg. 3. Hold the stretch for 15 to 30 seconds. Repeat 2 to 4 times. Plantar fascia and calf stretch 1. Stand on a step as shown above. Be sure to hold on to the banister.  
2. Slowly let your heels down over the edge of the step as you relax your calf muscles. You should feel a gentle stretch across the bottom of your foot and up the back of your leg to your knee. 3. Hold the stretch about 15 to 30 seconds, and then tighten your calf muscle a little to bring your heel back up to the level of the step. Repeat 2 to 4 times. Towel curls 1. While sitting, place your foot on a towel on the floor and scrunch the towel toward you with your toes. 2. Then, also using your toes, push the towel away from you. [place] pickups 1. Put marbles on the floor next to a cup. 
2. Using your toes, try to lift the marbles up from the floor and put them in the cup. Follow-up care is a key part of your treatment and safety. Be sure to make and go to all appointments, and call your doctor if you are having problems. It's also a good idea to know your test results and keep a list of the medicines you take. Where can you learn more? Go to http://eren-jennifer.info/ Darleen Huerta in the search box to learn more about \"Plantar Fasciitis: Exercises. \" Current as of: June 26, 2019Content Version: 12.4 © 1659-1025 [employer]. Care instructions adapted under license by [employer] (which disclaims liability or warranty for this information). If you have questions about a medical condition or this instruction, always ask your healthcare professional. [place] [address] any warranty or liability for your use of this information.

## 2020-05-20 NOTE — TELEPHONE ENCOUNTER
----- Message from Manas Zuniga sent at 5/20/2020  8:55 AM EDT -----  Regarding: Dr. Selvin Bar      Patient stated he was locked out of my chart. Advised of my chart support phone number. Patient declined and only wants to speak with Dr. Mikhail Beasley. Best contact number is [phone].

## 2020-06-11 ENCOUNTER — TELEPHONE (OUTPATIENT)
Dept: FAMILY MEDICINE CLINIC | Age: 55
End: 2020-06-11

## 2020-06-11 NOTE — TELEPHONE ENCOUNTER
----- Message from Felisha Jonest sent at 6/11/2020 11:14 AM EDT -----  Regarding: Dr. Cardenas Ranks: [phone]    Caller's first and last name: n/a   Callback required yes/no and why: yes  Best contact number(s): [phone]  Details to clarify the request: He would like to be seen by Dr. Cindy Vaca in person earlier than August 1 because he is experiencing pain in his L leg and cannot walk. He would like to do a uric acid test and nerve testing.

## 2020-06-12 NOTE — TELEPHONE ENCOUNTER
Outbound call to patient. Patient states that he continues to have leg pain and foot pain and would like an appointment. Understanding verbalized. Appointment scheduled.

## 2020-06-15 ENCOUNTER — OFFICE VISIT (OUTPATIENT)
Dept: FAMILY MEDICINE CLINIC | Age: 55
End: 2020-06-15

## 2020-06-15 VITALS
DIASTOLIC BLOOD PRESSURE: 80 MMHG | HEIGHT: 66 IN | TEMPERATURE: 98 F | WEIGHT: 187 LBS | OXYGEN SATURATION: 97 % | RESPIRATION RATE: 16 BRPM | HEART RATE: 84 BPM | SYSTOLIC BLOOD PRESSURE: 120 MMHG | BODY MASS INDEX: 30.05 KG/M2

## 2020-06-15 DIAGNOSIS — M72.2 PLANTAR FASCIITIS: Primary | ICD-10-CM

## 2020-06-15 NOTE — PROGRESS NOTES
Chief Complaint   Patient presents with    Foot Pain     left foot heel pain , symptoms occuring for more than one month .  tightness in heel . radiating pain to leg .  pain is intense when getting up in the morning. 1. Have you been to the ER, urgent care clinic since your last visit? Hospitalized since your last visit? No    2. Have you seen or consulted any other health care providers outside of the [address] [place] [address] since your last visit? Include any pap smears or colon screening.  No

## 2020-06-15 NOTE — PROGRESS NOTES
HISTORY OF PRESENT ILLNESS  Uche [place] is a 48 y.o. male. HPI  Follow up left foot pain. Had virtual visit for problem on 5/20/20. Diagnosed with plantar fasciitis. Advised ice, diclofenac and stretches. Reports he has done treatment with no relief. C/o pain medial heel radiating to instep. Worse in am when getting OOB. Aggravated by walking. Patient Active Problem List   Diagnosis Code    Uncontrolled type 2 diabetes circulatory disorder erectile dysfunction (HCC) E11.59, N52.1, E11.65    Sleep apnea, obstructive G47.33    TB lung, latent Z22.7    Insomnia G47.00    Mixed hyperlipidemia E78.2    Peripheral vascular disease ([facility] I73.9    Uncontrolled type 2 diabetes mellitus with hyperglycemia ([facility] E11.65    Obesity (BMI 30-39. 9) E66.9    Esophageal reflux K21.9    Fatty liver K76.0    Hypertension I10     Current Outpatient Medications   Medication Sig    atorvastatin (LIPITOR) 20 mg tablet Take 1 Tab by mouth daily.  glipiZIDE SR (GLUCOTROL XL) 10 mg CR tablet TAKE 1 TABLET BY MOUTH EVERY DAY    metFORMIN (GLUCOPHAGE) 500 mg tablet Take 2 Tabs by mouth two (2) times daily (with meals).  glipiZIDE SR (GLUCOTROL XL) 5 mg CR tablet TAKE 1 TABLET BY MOUTH EVERY DAY IN ADDITION TO 10MG TABLET FOR TOTAL DAILY DOSE OF 15MG    Omeprazole delayed release (PRILOSEC D/R) 20 mg tablet TAKE 1 TABLET BY MOUTH EVERY DAY    sildenafil citrate (VIAGRA) 25 mg tablet Take 1 Tab by mouth as needed (ED).  Blood-Glucose Meter monitoring kit Use to measure blood sugar 2 time daily and as needed.  Lancets misc Use to measure blood sugar 2 times daily and as needed    glucose blood VI test strips (ASCENSIA AUTODISC VI, ONE TOUCH ULTRA TEST VI) strip Use to measure blood sugar 2 times daily and as needed    diclofenac EC (VOLTAREN) 75 mg EC tablet Take 1 Tab by mouth two (2) times daily (with meals). No current facility-administered medications for this visit.       Social History     Tobacco Use    Smoking status: Never Smoker    Smokeless tobacco: Never Used   Substance Use Topics    Alcohol use: No    Drug use: No     Blood pressure 120/80, pulse 84, temperature 98 °F (36.7 °C), temperature source Oral, resp. rate 16, height 5' 6\" (1.676 m), weight 187 lb (84.8 kg), SpO2 97 %. Review of Systems   Musculoskeletal:        Foot pain as stated HPI   Skin: Negative. Neurological: Negative for tingling, sensory change and weakness. All other systems reviewed and are negative. Physical Exam  Constitutional:       General: He is not in acute distress. Cardiovascular:      Rate and Rhythm: Normal rate and regular rhythm. Pulmonary:      Effort: Pulmonary effort is normal.      Breath sounds: Normal breath sounds. Musculoskeletal: Normal range of motion. General: Tenderness (medial heel, medial instep) present. No deformity. Right lower leg: No edema. Left lower leg: No edema. Neurological:      Mental Status: He is alert. ASSESSMENT and PLAN  Diagnoses and all orders for this visit:    1. Plantar fasciitis  -     REFERRAL TO PODIATRY    Unresolved with present treatment. Refer to podiatry. Contact info given to patient to schedule appointment. We discussed the expected course, resolution and complications of the diagnosis in detail. Medication risks, benefits, costs, interactions and side effects were discussed. The patient was advised to contact the office for worsening of condition or FTI as anticipated. The patient expressed understanding of the diagnosis and plan.

## 2020-07-25 DIAGNOSIS — M72.2 PLANTAR FASCIITIS OF LEFT FOOT: ICD-10-CM

## 2020-07-26 RX ORDER — DICLOFENAC SODIUM 75 MG/1
TABLET, DELAYED RELEASE ORAL
Qty: 60 TAB | Refills: 0 | Status: SHIPPED | OUTPATIENT
Start: 2020-07-26 | End: 2020-09-29 | Stop reason: SDUPTHER

## 2020-08-21 ENCOUNTER — OFFICE VISIT (OUTPATIENT)
Dept: FAMILY MEDICINE CLINIC | Age: 55
End: 2020-08-21
Payer: MEDICAID

## 2020-08-21 VITALS
SYSTOLIC BLOOD PRESSURE: 113 MMHG | BODY MASS INDEX: 29.67 KG/M2 | WEIGHT: 184.6 LBS | DIASTOLIC BLOOD PRESSURE: 71 MMHG | HEIGHT: 66 IN | HEART RATE: 78 BPM | OXYGEN SATURATION: 98 % | RESPIRATION RATE: 16 BRPM | TEMPERATURE: 97.5 F

## 2020-08-21 DIAGNOSIS — G89.29 CHRONIC PAIN OF LEFT HEEL: ICD-10-CM

## 2020-08-21 DIAGNOSIS — M79.672 CHRONIC PAIN OF LEFT HEEL: ICD-10-CM

## 2020-08-21 PROCEDURE — 99213 OFFICE O/P EST LOW 20 MIN: CPT | Performed by: NURSE PRACTITIONER

## 2020-08-21 RX ORDER — PREDNISONE 20 MG/1
20 TABLET ORAL
Qty: 5 TAB | Refills: 0 | Status: SHIPPED | OUTPATIENT
Start: 2020-08-21 | End: 2020-11-25

## 2020-08-21 NOTE — PROGRESS NOTES
Assessment/Plan:     Diagnoses and all orders for this visit:    1. Uncontrolled type 2 diabetes circulatory disorder erectile dysfunction (HCC)  -     HEMOGLOBIN A1C WITH EAG  -     MICROALBUMIN, UR, RAND W/ MICROALB/CREAT RATIO  Continue current therapy. Labs pending. 2. Chronic pain of left heel  -     REFERRAL TO ORTHOPEDICS  -     predniSONE (DELTASONE) 20 mg tablet; Take 20 mg by mouth daily (with breakfast). Start treatment as above. Referred to orthopedics for additional evaluation. We have discussed the potential for hyperglycemia and he will monitor blood sugars and call if greater than 200. Follow-up and Dispositions    · Return in about 6 months (around 2/21/2021) for Follow Up. Discussed expected course/resolution/complications of diagnosis in detail with patient. Medication risks/benefits/costs/interactions/alternatives discussed with patient. Pt was given after visit summary which includes diagnoses, current medications & vitals. Pt expressed understanding with the diagnosis and plan          Subjective:      Sheron Resendiz is a 48 y.o. male who presents for had concerns including Foot Pain (LT heel pain x 3 months. pt states if he walks or stands for longer 2 minutes the pain is severe.) and Arm Pain (RT arm has been hurting x 3 days ). Reports a left heel pain for three months. Symptoms are not improved after evaluation by Dr. Shawna Schilder with cortisone injection for plantar faascitis however he feels symptoms are worse. He is using orthotics with no improvement. Patient is a 48 y.o. male that comes today for follow up of Diabetes Mellitus Type 2. Patient does regularly monitor  their FSBG at home. The fasting plasma glucose (fpg) usually runs around 130s mg/L. not attempting to follow a low fat, low cholesterol diet  Patients activity level: sedentery  there was no change in patient's weight. .  The patient has not experienced recent hypoglycemia.    reports that he has never smoked. He has never used smokeless tobacco.    Lab Results   Component Value Date/Time    Hemoglobin A1c 7.5 (H) 10/17/2019 04:22 PM    Hemoglobin A1c 7.1 (H) 11/18/2014 04:21 PM    Hemoglobin A1c 6.9 (H) 12/05/2013 11:05 AM         Patient Active Problem List   Diagnosis Code    Uncontrolled type 2 diabetes circulatory disorder erectile dysfunction (HCC) E11.59, N52.1, E11.65    Sleep apnea, obstructive G47.33    TB lung, latent Z22.7    Insomnia G47.00    Mixed hyperlipidemia E78.2    Peripheral vascular disease (HCC) I73.9    Uncontrolled type 2 diabetes mellitus with hyperglycemia ([facility] [address] E11.65    Obesity (BMI 30-39. 9) E66.9    Esophageal reflux K21.9    Fatty liver K76.0    Hypertension I10       Current Outpatient Medications   Medication Sig Dispense Refill    predniSONE (DELTASONE) 20 mg tablet Take 20 mg by mouth daily (with breakfast). 5 Tab 0    diclofenac EC (VOLTAREN) 75 mg EC tablet TAKE 1 TABLET BY MOUTH TWICE A DAY WITH MEALS 60 Tab 0    atorvastatin (LIPITOR) 20 mg tablet Take 1 Tab by mouth daily. 30 Tab 3    glipiZIDE SR (GLUCOTROL XL) 10 mg CR tablet TAKE 1 TABLET BY MOUTH EVERY DAY 30 Tab 5    metFORMIN (GLUCOPHAGE) 500 mg tablet Take 2 Tabs by mouth two (2) times daily (with meals). 360 Tab 3    glipiZIDE SR (GLUCOTROL XL) 5 mg CR tablet TAKE 1 TABLET BY MOUTH EVERY DAY IN ADDITION TO 10MG TABLET FOR TOTAL DAILY DOSE OF 15MG 30 Tab 3    Omeprazole delayed release (PRILOSEC D/R) 20 mg tablet TAKE 1 TABLET BY MOUTH EVERY DAY 30 Tab 3    sildenafil citrate (VIAGRA) 25 mg tablet Take 1 Tab by mouth as needed (ED). 10 Tab 4    Blood-Glucose Meter monitoring kit Use to measure blood sugar 2 time daily and as needed.  1 kit 0    Lancets misc Use to measure blood sugar 2 times daily and as needed 1 Package 11    glucose blood VI test strips (ASCENSIA AUTODISC VI, ONE TOUCH ULTRA TEST VI) strip Use to measure blood sugar 2 times daily and as needed 1 Package 11 No Known Allergies    ROS:   Review of Systems   Constitutional: Negative for malaise/fatigue. Eyes: Negative for blurred vision. Respiratory: Negative for shortness of breath. Cardiovascular: Negative for chest pain. Musculoskeletal: Positive for joint pain. Objective:     Visit Vitals  /71 (BP 1 Location: Left arm, BP Patient Position: Sitting)   Pulse 78   Temp 97.5 °F (36.4 °C) (Oral)   Resp 16   Ht 5' 6\" (1.676 m)   Wt 184 lb 9.6 oz (83.7 kg)   SpO2 98%   BMI 29.80 kg/m²       Vitals and Nurse Documentation reviewed. Physical Exam  Constitutional:       General: He is not in acute distress. Cardiovascular:      Heart sounds: S1 normal and S2 normal. No murmur. No friction rub. No gallop. Pulmonary:      Effort: No respiratory distress. Breath sounds: Normal breath sounds. Musculoskeletal:      Left foot: Normal range of motion and normal capillary refill. Tenderness present. No swelling or deformity. Feet:    Skin:     General: Skin is warm and dry.    Psychiatric:         Mood and Affect: Mood and affect normal.

## 2020-08-21 NOTE — PROGRESS NOTES
Chief Complaint   Patient presents with    Foot Pain     LT heel pain x 3 months. pt states if he walks or stands for longer 2 minutes the pain is severe.  Arm Pain     RT arm has been hurting x 3 days        1. Have you been to the ER, urgent care clinic since your last visit? Hospitalized since your last visit? No    2. Have you seen or consulted any other health care providers outside of the [address] [place] [address] since your last visit? Include any pap smears or colon screening.  No

## 2020-08-21 NOTE — LETTER
8/21/2020 Mr. Bernardo Fenton [address] [facility] [id number] To Whom It May Concern: 
 
Bernardo Fenton was under the care of [facility] He is currently undergoing evaluation for chronic left foot pain. Please allow him to avoid walking continuously for more than 5 minutes. Allow breaks for sitting as needed. If there are questions or concerns please have the patient contact our office. Sincerely, Caitlin Herrmann NP

## 2020-08-22 LAB
ALBUMIN/CREAT UR: 4 MG/G CREAT (ref 0–29)
CREAT UR-MCNC: 96.6 MG/DL
EST. AVERAGE GLUCOSE BLD GHB EST-MCNC: 163 MG/DL
HBA1C MFR BLD: 7.3 % (ref 4.8–5.6)
MICROALBUMIN UR-MCNC: 3.6 UG/ML

## 2020-08-24 NOTE — PROGRESS NOTES
Labs are stable. Just work on improving the diet. If he wants to work with Anai Lewis, she is currently virtual.  Increase his aerobic activity.

## 2020-08-29 ENCOUNTER — OFFICE VISIT (OUTPATIENT)
Dept: URGENT CARE | Age: 55
End: 2020-08-29
Payer: MEDICAID

## 2020-08-29 VITALS — OXYGEN SATURATION: 98 % | RESPIRATION RATE: 16 BRPM | TEMPERATURE: 98 F | HEART RATE: 81 BPM

## 2020-08-29 DIAGNOSIS — Z20.822 SUSPECTED COVID-19 VIRUS INFECTION: Primary | ICD-10-CM

## 2020-08-29 PROCEDURE — 99212 OFFICE O/P EST SF 10 MIN: CPT | Performed by: FAMILY MEDICINE

## 2020-08-29 NOTE — PROGRESS NOTES
2020    Sheron Resendiz (:  1965) is a 48 y.o.  male, here requesting COVID-19 testing    History of Present Illness  Close contact with a lab confirmed COVID-19 patient within 14 days of symptom onset     Visit Vitals  Pulse 81   Temp 98 °F (36.7 °C)   Resp 16   SpO2 98%       ASSESSMENT  Screening for COVID-19/ Viral disease    PLAN  POCT influenza testing - Not Tested  COVID-19 sample collected and submitted. Patient given detailed CDC instructions contained within After Visit Summary. An  electronic signature was used to authenticate this note.     --Charlene Rousseau MD

## 2020-08-31 LAB — SARS-COV-2, NAA: NOT DETECTED

## 2020-08-31 NOTE — PROGRESS NOTES
Name and  verified. Informed pt of result notes and recommendations per [facility]. Pt was confused and requested I help him set up his Mychart so he can view his labs.  Pt was set up with [employer], viewed his labs and stated he understands now

## 2020-09-09 NOTE — TELEPHONE ENCOUNTER
Last Visit: 8/21/20  NP Katty  Next Appointment: Not scheduled- due 2/21/21  Previous Refill Encounter(s): 8/27/19  10 + 4    Requested Prescriptions     Pending Prescriptions Disp Refills    sildenafil citrate (VIAGRA) 25 mg tablet 10 Tab 4     Sig: Take 1 Tab by mouth as needed (ED).

## 2020-09-10 RX ORDER — SILDENAFIL 25 MG/1
25 TABLET, FILM COATED ORAL AS NEEDED
Qty: 10 TAB | Refills: 4 | Status: SHIPPED | OUTPATIENT
Start: 2020-09-10 | End: 2021-11-24

## 2020-09-15 DIAGNOSIS — E78.2 MIXED HYPERLIPIDEMIA: ICD-10-CM

## 2020-09-15 RX ORDER — ATORVASTATIN CALCIUM 20 MG/1
TABLET, FILM COATED ORAL
Qty: 30 TAB | Refills: 3 | Status: SHIPPED | OUTPATIENT
Start: 2020-09-15 | End: 2021-03-10 | Stop reason: SDUPTHER

## 2020-09-29 DIAGNOSIS — M72.2 PLANTAR FASCIITIS OF LEFT FOOT: ICD-10-CM

## 2020-09-29 RX ORDER — DICLOFENAC SODIUM 75 MG/1
TABLET, DELAYED RELEASE ORAL
Qty: 60 TAB | Refills: 3 | Status: SHIPPED | OUTPATIENT
Start: 2020-09-29

## 2020-09-29 NOTE — TELEPHONE ENCOUNTER
Last Visit: 8/21/20  NP Katty  Next Appointment: Not scheduled  Previous Refill Encounter(s): 7/26/20  60    Requested Prescriptions     Pending Prescriptions Disp Refills    diclofenac EC (VOLTAREN) 75 mg EC tablet 60 Tab 3     Sig: TAKE 1 TABLET BY MOUTH TWICE A DAY WITH MEALS

## 2020-10-06 ENCOUNTER — OFFICE VISIT (OUTPATIENT)
Dept: URGENT CARE | Age: 55
End: 2020-10-06

## 2020-10-06 VITALS — OXYGEN SATURATION: 98 % | HEART RATE: 79 BPM | TEMPERATURE: 98.1 F | RESPIRATION RATE: 14 BRPM

## 2020-10-06 DIAGNOSIS — Z20.822 COVID-19 RULED OUT: Primary | ICD-10-CM

## 2020-10-06 NOTE — PROGRESS NOTES
10/6/2020    Barbara Garg (:  1965) is a 48 y.o.  male, here requesting COVID-19 testing    History of Present Illness  chills muscle ache, sore throat for 2-3 days     Visit Vitals  Pulse 79   Temp 98.1 °F (36.7 °C)   Resp 14   SpO2 98%       ASSESSMENT  Screening for COVID-19/ Viral disease    PLAN  POCT influenza testing - Not Tested  COVID-19 sample collected and submitted. Patient given detailed CDC instructions contained within After Visit Summary. An  electronic signature was used to authenticate this note.     --Lyly Bingham MD

## 2020-10-08 LAB — SARS-COV-2, NAA: NOT DETECTED

## 2020-11-25 ENCOUNTER — OFFICE VISIT (OUTPATIENT)
Dept: FAMILY MEDICINE CLINIC | Age: 55
End: 2020-11-25
Payer: MEDICAID

## 2020-11-25 VITALS
HEIGHT: 66 IN | RESPIRATION RATE: 16 BRPM | TEMPERATURE: 98 F | WEIGHT: 186.2 LBS | DIASTOLIC BLOOD PRESSURE: 71 MMHG | HEART RATE: 78 BPM | OXYGEN SATURATION: 97 % | SYSTOLIC BLOOD PRESSURE: 114 MMHG | BODY MASS INDEX: 29.92 KG/M2

## 2020-11-25 DIAGNOSIS — R00.2 HEART PALPITATIONS: ICD-10-CM

## 2020-11-25 DIAGNOSIS — M10.9 ACUTE GOUT OF FOOT, UNSPECIFIED CAUSE, UNSPECIFIED LATERALITY: ICD-10-CM

## 2020-11-25 DIAGNOSIS — Z12.5 ENCOUNTER FOR PROSTATE CANCER SCREENING: ICD-10-CM

## 2020-11-25 DIAGNOSIS — Z78.9 HISTORY OF RECENT AIR TRAVEL: ICD-10-CM

## 2020-11-25 DIAGNOSIS — K21.9 GASTROESOPHAGEAL REFLUX DISEASE WITHOUT ESOPHAGITIS: ICD-10-CM

## 2020-11-25 DIAGNOSIS — R06.09 DYSPNEA ON EXERTION: ICD-10-CM

## 2020-11-25 PROCEDURE — 99214 OFFICE O/P EST MOD 30 MIN: CPT | Performed by: NURSE PRACTITIONER

## 2020-11-25 PROCEDURE — 3051F HG A1C>EQUAL 7.0%<8.0%: CPT | Performed by: NURSE PRACTITIONER

## 2020-11-25 RX ORDER — PHENOL/SODIUM PHENOLATE
AEROSOL, SPRAY (ML) MUCOUS MEMBRANE
Qty: 30 TAB | Refills: 3 | Status: SHIPPED | OUTPATIENT
Start: 2020-11-25 | End: 2021-11-30

## 2020-11-25 NOTE — PROGRESS NOTES
Chief Complaint   Patient presents with    Breathing Problem     x 2 months     Heart Problem     when pt goes up atleast 2 floors of stairs pt will hear his heartbeat very strong. x 2 months     Labs     1. Have you been to the ER, urgent care clinic since your last visit? Hospitalized since your last visit? No     2. Have you seen or consulted any other health care providers outside of the [address] [place] [address] since your last visit? Include any pap smears or colon screening.  No

## 2020-11-30 NOTE — PROGRESS NOTES
Assessment/Plan:     Diagnoses and all orders for this visit:    1. Uncontrolled type 2 diabetes circulatory disorder erectile dysfunction (HCC)  -     METABOLIC PANEL, COMPREHENSIVE; Future  -     LIPID PANEL; Future  -     MICROALBUMIN, UR, RAND W/ MICROALB/CREAT RATIO; Future  -     HEMOGLOBIN A1C WITH EAG; Future  -     REFERRAL TO DIETITIAN    2. Gastroesophageal reflux disease without esophagitis  -     Omeprazole delayed release (PRILOSEC D/R) 20 mg tablet; TAKE 1 TABLET BY MOUTH EVERY DAY  Stable. Refilled today. Continue current therapy. 3. Heart palpitations  -     REFERRAL TO CARDIOLOGY    4. Acute gout of foot, unspecified cause, unspecified laterality  -     URIC ACID; Future  Was treated in [facility]. Labs pending. 5. Dyspnea on exertion  -     D DIMER; Future  -     REFERRAL TO CARDIOLOGY for additional evaluation. Cardiac risk factors include diabetes, obesity, hypertension, mixed hyperlipidemia. 6. History of recent air travel  -     D DIMER; Future    7. Encounter for prostate cancer screening  -     PSA W/ REFLX FREE PSA; Future        Follow-up and Dispositions    · Return in about 4 weeks (around 12/23/2020) for Follow Up. Discussed expected course/resolution/complications of diagnosis in detail with patient. Medication risks/benefits/costs/interactions/alternatives discussed with patient. Pt was given after visit summary which includes diagnoses, current medications & vitals. Pt expressed understanding with the diagnosis and plan          Subjective:      Jade Figueroa is a 50 y.o. male who presents for had concerns including Breathing Problem (x 2 months ); Heart Problem (when pt goes up atleast 2 floors of stairs pt will hear his heartbeat very strong. x 2 months ); and Labs. Reports a two month history of dyspnea upon exertion. Notices symptoms more when using stairs. Recently was in [facility] for 4 weeks, using more stairs, and symptoms were more noticeable. Denies fever, cough. Reports intermittent palpitations lasting seconds then spontaneously resolving. Denies diaphoresis, chest pain. He is sedentary. He is not on a specific diet. He is asymptomatic today. Patient Active Problem List   Diagnosis Code    Uncontrolled type 2 diabetes circulatory disorder erectile dysfunction (HCC) E11.59, N52.1, E11.65    Sleep apnea, obstructive G47.33    TB lung, latent Z22.7    Insomnia G47.00    Mixed hyperlipidemia E78.2    Peripheral vascular disease ([facility] [address] I73.9    Uncontrolled type 2 diabetes mellitus with hyperglycemia ([facility] [address] E11.65    Obesity (BMI 30-39. 9) E66.9    Esophageal reflux K21.9    Fatty liver K76.0    Hypertension I10       Current Outpatient Medications   Medication Sig Dispense Refill    Omeprazole delayed release (PRILOSEC D/R) 20 mg tablet TAKE 1 TABLET BY MOUTH EVERY DAY 30 Tab 3    glipiZIDE SR (GLUCOTROL XL) 10 mg CR tablet Take 1 tab by mouth every day in addition to the Glucotrol XL 5 mg for a total daily dose of 15 mg. 90 Tab 3    glipiZIDE SR (GLUCOTROL XL) 5 mg CR tablet Take 1 tab by mouth every day in addition to Glucotrol XL 10 mg for a total daily dose of 15 mg. 90 Tab 3    diclofenac EC (VOLTAREN) 75 mg EC tablet TAKE 1 TABLET BY MOUTH TWICE A DAY WITH MEALS 60 Tab 3    atorvastatin (LIPITOR) 20 mg tablet TAKE 1 TABLET BY MOUTH EVERY DAY 30 Tab 3    sildenafil citrate (VIAGRA) 25 mg tablet Take 1 Tab by mouth as needed (ED). 10 Tab 4    metFORMIN (GLUCOPHAGE) 500 mg tablet Take 2 Tabs by mouth two (2) times daily (with meals). 360 Tab 3    Blood-Glucose Meter monitoring kit Use to measure blood sugar 2 time daily and as needed.  1 kit 0    Lancets misc Use to measure blood sugar 2 times daily and as needed 1 Package 11    glucose blood VI test strips (ASCENSIA AUTODISC VI, ONE TOUCH ULTRA TEST VI) strip Use to measure blood sugar 2 times daily and as needed 1 Package 11       No Known Allergies    ROS:   Review of Systems   Constitutional: Negative for malaise/fatigue. Eyes: Negative for blurred vision. Respiratory: Negative for shortness of breath. Cardiovascular: Negative for chest pain. Objective:     Visit Vitals  /71 (BP 1 Location: Left arm, BP Patient Position: Sitting)   Pulse 78   Temp 98 °F (36.7 °C) (Oral)   Resp 16   Ht 5' 6\" (1.676 m)   Wt 186 lb 3.2 oz (84.5 kg)   SpO2 97%   BMI 30.05 kg/m²       Vitals and Nurse Documentation reviewed. Physical Exam  Constitutional:       General: He is not in acute distress. HENT:      Right Ear: No middle ear effusion. Tympanic membrane is not erythematous or bulging. Left Ear:  No middle ear effusion. Tympanic membrane is not erythematous or bulging. Nose: No rhinorrhea. Right Sinus: No maxillary sinus tenderness or frontal sinus tenderness. Left Sinus: No maxillary sinus tenderness or frontal sinus tenderness. Mouth/Throat:      Pharynx: No oropharyngeal exudate or posterior oropharyngeal erythema. Eyes:      General: Lids are normal.   Cardiovascular:      Heart sounds: S1 normal and S2 normal. No murmur. No friction rub. No gallop. Pulmonary:      Breath sounds: Normal breath sounds. No wheezing. Lymphadenopathy:      Cervical: No cervical adenopathy. Skin:     General: Skin is warm and dry.    Psychiatric:         Mood and Affect: Mood and affect normal.

## 2020-11-30 NOTE — PATIENT INSTRUCTIONS
Noninsulin Medicines for Type 2 Diabetes: Care Instructions Overview There are different types of noninsulin medicines for diabetes. Each works in a different way. But they all help you control your blood sugar. Some types help your body make insulin to lower your blood sugar. Others lower how much insulin your body needs. Some can slow how fast your body digests sugars. And some can remove extra glucose through your urine. You may need to take more than one medicine for diabetes. Two or more medicines may work better to lower your blood sugar level than just one does. · Metformin. This lowers how much glucose your liver makes. And it helps you respond better to insulin. It also lowers the amount of stored sugar that your liver releases when you are not eating. · Sulfonylureas. These help your body release more insulin. Some work for many hours. They can cause low blood sugar if you don't eat as you planned. An example is glipizide. · Thiazolidinediones. These reduce the amount of blood glucose. They also help you respond better to insulin. An example is pioglitazone. · SGLT2 inhibitors. These help to remove extra glucose through your urine. They may also help some people lose weight. An example is ertugliflozin. · DPP-4 inhibitors. These help your body raise the level of insulin after you eat. They also help your body make less of a hormone that raises blood sugar. An example is alogliptin. · Incretin hormones (GLP-1 receptor agonists). These help your body make a protein that can raise your insulin level and make you less hungry. They're given as shots or pills. An example is semaglutide. · Meglitinides. These help your body release insulin. They also help slow how your body digests sugars. So they can keep your blood sugar from rising too fast after you eat. · Alpha-glucosidase inhibitors. These keep starches from breaking down. This means that they lower the amount of glucose absorbed when you eat. They don't help your body make more insulin. So they will not cause low blood sugar unless you use them with other medicines for diabetes. Follow-up care is a key part of your treatment and safety. Be sure to make and go to all appointments, and call your doctor if you are having problems. It's also a good idea to know your test results and keep a list of the medicines you take. How can you care for yourself at home? · Eat a healthy diet. Get some exercise each day. This may help you to reduce how much medicine you need. · Do not take other prescription or over-the-counter medicines, vitamins, herbal products, or supplements without talking to your doctor first. Some medicines for type 2 diabetes can cause problems with other medicines or supplements. · Tell your doctor if you plan to get pregnant. Some of these drugs are not safe for pregnant women. · Be safe with medicines. Take your medicines exactly as prescribed. Meglitinides and sulfonylureas can cause your blood sugar to drop very low. Call your doctor if you think you are having a problem with your medicine. · Check your blood sugar often. You can use a glucose monitor. Keeping track can help you know how certain foods, activities, and medicines affect your blood sugar. And it can help you keep your blood sugar from getting so low that it's not safe. When should you call for help? Call 911 anytime you think you may need emergency care. For example, call if: 
  · You passed out (lost consciousness).  
  · You are confused or cannot think clearly.  
  · Your blood sugar is very high or very low. Watch closely for changes in your health, and be sure to contact your doctor if: 
  · Your blood sugar stays outside the level your doctor set for you.  
  · You have any problems. Where can you learn more? Go to http://www.[employer].com/ Enter H153 in the search box to learn more about \"Noninsulin Medicines for Type 2 Diabetes: Care Instructions. \" Current as of: December 20, 2019               Content Version: 12.6 © 5712-2717 [employer]. Care instructions adapted under license by [employer] (which disclaims liability or warranty for this information). If you have questions about a medical condition or this instruction, always ask your healthcare professional. [place] [address] any warranty or liability for your use of this information.

## 2020-12-24 ENCOUNTER — LAB ONLY (OUTPATIENT)
Dept: FAMILY MEDICINE CLINIC | Age: 55
End: 2020-12-24

## 2020-12-24 DIAGNOSIS — R06.09 DYSPNEA ON EXERTION: ICD-10-CM

## 2020-12-24 DIAGNOSIS — M10.9 ACUTE GOUT OF FOOT, UNSPECIFIED CAUSE, UNSPECIFIED LATERALITY: ICD-10-CM

## 2020-12-24 DIAGNOSIS — Z78.9 HISTORY OF RECENT AIR TRAVEL: ICD-10-CM

## 2020-12-24 DIAGNOSIS — Z12.5 ENCOUNTER FOR PROSTATE CANCER SCREENING: ICD-10-CM

## 2020-12-25 LAB
ALBUMIN SERPL-MCNC: 3.5 G/DL (ref 3.5–5)
ALBUMIN/GLOB SERPL: 1 {RATIO} (ref 1.1–2.2)
ALP SERPL-CCNC: 79 U/L (ref 45–117)
ALT SERPL-CCNC: 30 U/L (ref 12–78)
ANION GAP SERPL CALC-SCNC: 5 MMOL/L (ref 5–15)
AST SERPL-CCNC: 16 U/L (ref 15–37)
BILIRUB SERPL-MCNC: 0.8 MG/DL (ref 0.2–1)
BUN SERPL-MCNC: 12 MG/DL (ref 6–20)
BUN/CREAT SERPL: 13 (ref 12–20)
CALCIUM SERPL-MCNC: 8.9 MG/DL (ref 8.5–10.1)
CHLORIDE SERPL-SCNC: 104 MMOL/L (ref 97–108)
CHOLEST SERPL-MCNC: 129 MG/DL
CO2 SERPL-SCNC: 28 MMOL/L (ref 21–32)
CREAT SERPL-MCNC: 0.96 MG/DL (ref 0.7–1.3)
CREAT UR-MCNC: 188 MG/DL
D DIMER PPP FEU-MCNC: 0.22 MG/L FEU (ref 0–0.65)
EST. AVERAGE GLUCOSE BLD GHB EST-MCNC: 169 MG/DL
GLOBULIN SER CALC-MCNC: 3.5 G/DL (ref 2–4)
GLUCOSE SERPL-MCNC: 213 MG/DL (ref 65–100)
HBA1C MFR BLD: 7.5 % (ref 4–5.6)
HDLC SERPL-MCNC: 54 MG/DL
HDLC SERPL: 2.4 {RATIO} (ref 0–5)
LDLC SERPL CALC-MCNC: 33.6 MG/DL (ref 0–100)
LIPID PROFILE,FLP: ABNORMAL
MICROALBUMIN UR-MCNC: 0.99 MG/DL
MICROALBUMIN/CREAT UR-RTO: 5 MG/G (ref 0–30)
POTASSIUM SERPL-SCNC: 4.1 MMOL/L (ref 3.5–5.1)
PROT SERPL-MCNC: 7 G/DL (ref 6.4–8.2)
PSA SERPL-MCNC: 0.7 NG/ML (ref 0–4)
REFLEX CRITERIA: NORMAL
SODIUM SERPL-SCNC: 137 MMOL/L (ref 136–145)
TRIGL SERPL-MCNC: 207 MG/DL (ref ?–150)
URATE SERPL-MCNC: 5 MG/DL (ref 3.5–7.2)
VLDLC SERPL CALC-MCNC: 41.4 MG/DL

## 2020-12-30 ENCOUNTER — APPOINTMENT (OUTPATIENT)
Dept: NUTRITION | Age: 55
End: 2020-12-30

## 2021-02-26 ENCOUNTER — OFFICE VISIT (OUTPATIENT)
Dept: FAMILY MEDICINE CLINIC | Age: 56
End: 2021-02-26
Payer: MEDICAID

## 2021-02-26 VITALS
DIASTOLIC BLOOD PRESSURE: 66 MMHG | BODY MASS INDEX: 30.53 KG/M2 | HEIGHT: 66 IN | SYSTOLIC BLOOD PRESSURE: 106 MMHG | RESPIRATION RATE: 16 BRPM | WEIGHT: 190 LBS | TEMPERATURE: 98.5 F | OXYGEN SATURATION: 98 % | HEART RATE: 82 BPM

## 2021-02-26 DIAGNOSIS — R10.11 RIGHT UPPER QUADRANT ABDOMINAL PAIN: ICD-10-CM

## 2021-02-26 PROCEDURE — 99213 OFFICE O/P EST LOW 20 MIN: CPT | Performed by: NURSE PRACTITIONER

## 2021-02-26 PROCEDURE — 83036 HEMOGLOBIN GLYCOSYLATED A1C: CPT | Performed by: NURSE PRACTITIONER

## 2021-02-26 RX ORDER — METFORMIN HYDROCHLORIDE 500 MG/1
2000 TABLET, EXTENDED RELEASE ORAL
Qty: 120 TAB | Refills: 3 | Status: SHIPPED | OUTPATIENT
Start: 2021-02-26 | End: 2021-05-07 | Stop reason: SDUPTHER

## 2021-02-26 NOTE — PROGRESS NOTES
Chief Complaint   Patient presents with    Follow-up     3 month follow up     Other     pt would like to discuss previous lab results regarding liver function and kidney        1. Have you been to the ER, urgent care clinic since your last visit? Hospitalized since your last visit? No     2. Have you seen or consulted any other health care providers outside of the [address] [place] [address] since your last visit? Include any pap smears or colon screening.  No

## 2021-02-27 NOTE — PROGRESS NOTES
Assessment/Plan:     Diagnoses and all orders for this visit:    1. Uncontrolled type 2 diabetes circulatory disorder erectile dysfunction (HCC)  -     AMB POC HEMOGLOBIN A1C  -     metFORMIN ER (GLUCOPHAGE XR) 500 mg tablet; Take 4 Tabs by mouth Daily (before breakfast). -     REFERRAL TO DIETITIAN  Increase Metformin as above. Follow up in three months. 2. Right upper quadrant abdominal pain  -     REFERRAL TO GASTROENTEROLOGY            Discussed expected course/resolution/complications of diagnosis in detail with patient. Medication risks/benefits/costs/interactions/alternatives discussed with patient. Pt was given after visit summary which includes diagnoses, current medications & vitals. Pt expressed understanding with the diagnosis and plan          Subjective:      Ilya Joy is a 50 y.o. male who presents for had concerns including Follow-up (3 month follow up ) and Other (pt would like to discuss previous lab results regarding liver function and kidney ). Patient is a 50 y.o. male that comes today for follow up of Diabetes Mellitus Type 2. Patient does not regularly monitor  their FSBG at home. The fasting plasma glucose (fpg) is unknown. not attempting to follow a low fat, low cholesterol diet  Patients activity level: sedentery  gained, 7 lbs. .  The patient has not experienced recent hypoglycemia. reports that he has never smoked. He has never used smokeless tobacco.    Lab Results   Component Value Date/Time    Hemoglobin A1c 7.5 (H) 12/24/2020 11:22 AM    Hemoglobin A1c 7.3 (H) 08/21/2020 09:12 AM    Hemoglobin A1c 7.5 (H) 10/17/2019 04:22 PM     Reports a 20 year history of right upper quadrant abdominal pain. Previous imaging has been unremarkable.     Patient Active Problem List   Diagnosis Code    Uncontrolled type 2 diabetes circulatory disorder erectile dysfunction (HCC) E11.59, N52.1, E11.65    Sleep apnea, obstructive G47.33    TB lung, latent Z22.7    Insomnia G47.00    Mixed hyperlipidemia E78.2    Peripheral vascular disease ([address] I73.9    Uncontrolled type 2 diabetes mellitus with hyperglycemia (HCC) E11.65    Obesity (BMI 30-39. 9) E66.9    Esophageal reflux K21.9    Fatty liver K76.0    Hypertension I10       Current Outpatient Medications   Medication Sig Dispense Refill    metFORMIN ER (GLUCOPHAGE XR) 500 mg tablet Take 4 Tabs by mouth Daily (before breakfast). 120 Tab 3    Omeprazole delayed release (PRILOSEC D/R) 20 mg tablet TAKE 1 TABLET BY MOUTH EVERY DAY 30 Tab 3    glipiZIDE SR (GLUCOTROL XL) 10 mg CR tablet Take 1 tab by mouth every day in addition to the Glucotrol XL 5 mg for a total daily dose of 15 mg. 90 Tab 3    glipiZIDE SR (GLUCOTROL XL) 5 mg CR tablet Take 1 tab by mouth every day in addition to Glucotrol XL 10 mg for a total daily dose of 15 mg. 90 Tab 3    diclofenac EC (VOLTAREN) 75 mg EC tablet TAKE 1 TABLET BY MOUTH TWICE A DAY WITH MEALS 60 Tab 3    atorvastatin (LIPITOR) 20 mg tablet TAKE 1 TABLET BY MOUTH EVERY DAY 30 Tab 3    Blood-Glucose Meter monitoring kit Use to measure blood sugar 2 time daily and as needed. 1 kit 0    Lancets misc Use to measure blood sugar 2 times daily and as needed 1 Package 11    glucose blood VI test strips (ASCENSIA AUTODISC VI, ONE TOUCH ULTRA TEST VI) strip Use to measure blood sugar 2 times daily and as needed 1 Package 11    sildenafil citrate (VIAGRA) 25 mg tablet Take 1 Tab by mouth as needed (ED). 10 Tab 4       No Known Allergies    ROS:   Review of Systems   Constitutional: Negative for malaise/fatigue. Eyes: Negative for blurred vision. Respiratory: Negative for shortness of breath. Cardiovascular: Negative for chest pain. Gastrointestinal: Positive for abdominal pain.        Objective:     Visit Vitals  /66 (BP 1 Location: Left upper arm, BP Patient Position: Sitting, BP Cuff Size: Adult)   Pulse 82   Temp 98.5 °F (36.9 °C) (Temporal)   Resp 16   Ht 5' 6\" (1.676 m)   Wt 190 lb (86.2 kg)   SpO2 98%   BMI 30.67 kg/m²       Vitals and Nurse Documentation reviewed. Physical Exam  Constitutional:       General: He is not in acute distress. Cardiovascular:      Heart sounds: S1 normal and S2 normal. No murmur. No friction rub. No gallop. Pulmonary:      Effort: No respiratory distress. Breath sounds: Normal breath sounds. Skin:     General: Skin is warm and dry.    Psychiatric:         Mood and Affect: Mood and affect normal.

## 2021-03-02 LAB — HBA1C MFR BLD HPLC: 8 %

## 2021-03-10 DIAGNOSIS — E78.2 MIXED HYPERLIPIDEMIA: ICD-10-CM

## 2021-03-10 RX ORDER — ATORVASTATIN CALCIUM 20 MG/1
20 TABLET, FILM COATED ORAL DAILY
Qty: 30 TAB | Refills: 5 | Status: SHIPPED | OUTPATIENT
Start: 2021-03-10 | End: 2021-06-18 | Stop reason: SDUPTHER

## 2021-03-10 RX ORDER — ATORVASTATIN CALCIUM 20 MG/1
20 TABLET, FILM COATED ORAL DAILY
Qty: 30 TAB | Refills: 5 | Status: CANCELLED | OUTPATIENT
Start: 2021-03-10

## 2021-03-10 NOTE — TELEPHONE ENCOUNTER
Patient requesting refill    Requested Prescriptions     Pending Prescriptions Disp Refills    atorvastatin (LIPITOR) 20 mg tablet 30 Tab 5     Sig: Take 1 Tab by mouth daily.      Best call back # [phone]

## 2021-03-10 NOTE — TELEPHONE ENCOUNTER
Duplicate request - Lipitor 20 mg #30 with 5 refills was sent to [facility] on 03/10/2021. Requested Prescriptions     Pending Prescriptions Disp Refills    atorvastatin (LIPITOR) 20 mg tablet 30 Tab 5     Sig: Take 1 Tab by mouth daily.

## 2021-03-10 NOTE — TELEPHONE ENCOUNTER
Last Visit: 2/26/21 NP Katty, labs 12/2020  Next Appointment: 5/24/21 LEVI Alaniz  Previous Refill Encounter(s): 9/15/20 30 + 3    Requested Prescriptions     Pending Prescriptions Disp Refills    atorvastatin (LIPITOR) 20 mg tablet 30 Tab 5     Sig: Take 1 Tab by mouth daily.

## 2021-03-16 ENCOUNTER — PATIENT MESSAGE (OUTPATIENT)
Dept: FAMILY MEDICINE CLINIC | Age: 56
End: 2021-03-16

## 2021-03-24 ENCOUNTER — HOSPITAL ENCOUNTER (OUTPATIENT)
Dept: NUTRITION | Age: 56
Discharge: HOME | End: 2021-03-24
Payer: MEDICAID

## 2021-03-24 DIAGNOSIS — E11.65 UNCONTROLLED TYPE 2 DIABETES MELLITUS WITH HYPERGLYCEMIA (HCC): ICD-10-CM

## 2021-03-24 PROCEDURE — 97802 MEDICAL NUTRITION INDIV IN: CPT | Performed by: DIETITIAN, REGISTERED

## 2021-03-24 NOTE — PROGRESS NOTES
Geovany Rico was informed of the inherent limitations of a virtual visit,  and has consented to a virtual therapy visit on 3/24/2021. Information regarding emergency contact information for this patient during this visit is to contact:  no number provided in addition to calling 911. The patient was informed that at any time during the virtual visit, they can decide to stop the virtual visit. The patient verified that they are physically located in the [facility] for this virtual visit. [id number] [facility]     Nutrition Assessment  Medical Nutrition Therapy   Outpatient Initial Evaluation         Patient Name: Geovany Rico : 1965   Treatment Diagnosis: E11.59,N52.1,E11.65 (ICD-10-CM) - Uncontrolled type 2 diabetes circulatory disorder erectile dysfunction [facility])   Referral Source: Laura Bach NP [facility]): 3/24/2021     In time:  4:13pm             Out time: 5:13pm   Total Treatment Time (min):   60     Gender: male Age: 50 y.o. Ht: 66 in Wt:  190 lb  kg   BMI: 30.7 BMR   Male 1650 AF 1.2     Past Medical History:  Patient Active Problem List   Diagnosis Code    Uncontrolled type 2 diabetes circulatory disorder erectile dysfunction (HCC) E11.59, N52.1, E11.65    Sleep apnea, obstructive G47.33    TB lung, latent Z22.7    Insomnia G47.00    Mixed hyperlipidemia E78.2    Peripheral vascular disease ([address] I73.9    Uncontrolled type 2 diabetes mellitus with hyperglycemia ([facility] [address] E11.65    Obesity (BMI 30-39. 9) E66.9    Esophageal reflux K21.9    Fatty liver K76.0    Hypertension I10        Pertinent Medications:     Current Outpatient Medications:     empagliflozin (JARDIANCE) 10 mg tablet, Take 1 Tab by mouth daily. , Disp: 30 Tab, Rfl: 3    atorvastatin (LIPITOR) 20 mg tablet, Take 1 Tab by mouth daily. , Disp: 30 Tab, Rfl: 5    metFORMIN ER (GLUCOPHAGE XR) 500 mg tablet, Take 4 Tabs by mouth Daily (before breakfast). , Disp: 120 Tab, Rfl: 3    Omeprazole delayed release (PRILOSEC D/R) 20 mg tablet, TAKE 1 TABLET BY MOUTH EVERY DAY, Disp: 30 Tab, Rfl: 3    glipiZIDE SR (GLUCOTROL XL) 10 mg CR tablet, Take 1 tab by mouth every day in addition to the Glucotrol XL 5 mg for a total daily dose of 15 mg., Disp: 90 Tab, Rfl: 3    glipiZIDE SR (GLUCOTROL XL) 5 mg CR tablet, Take 1 tab by mouth every day in addition to Glucotrol XL 10 mg for a total daily dose of 15 mg., Disp: 90 Tab, Rfl: 3    diclofenac EC (VOLTAREN) 75 mg EC tablet, TAKE 1 TABLET BY MOUTH TWICE A DAY WITH MEALS, Disp: 60 Tab, Rfl: 3    sildenafil citrate (VIAGRA) 25 mg tablet, Take 1 Tab by mouth as needed (ED)., Disp: 10 Tab, Rfl: 4    Blood-Glucose Meter monitoring kit, Use to measure blood sugar 2 time daily and as needed. , Disp: 1 kit, Rfl: 0    Lancets misc, Use to measure blood sugar 2 times daily and as needed, Disp: 1 Package, Rfl: 11    glucose blood VI test strips (ASCENSIA AUTODISC VI, ONE TOUCH ULTRA TEST VI) strip, Use to measure blood sugar 2 times daily and as needed, Disp: 1 Package, Rfl: 11     Biochemical Data:   Lab Results   Component Value Date/Time    Hemoglobin A1c 7.5 (H) 12/24/2020 11:22 AM    Hemoglobin A1c (POC) 8.0 02/26/2021 01:21 PM     Lab Results   Component Value Date/Time    Sodium 137 12/24/2020 11:22 AM    Potassium 4.1 12/24/2020 11:22 AM    Chloride 104 12/24/2020 11:22 AM    CO2 28 12/24/2020 11:22 AM    Anion gap 5 12/24/2020 11:22 AM    Glucose 213 (H) 12/24/2020 11:22 AM    BUN 12 12/24/2020 11:22 AM    Creatinine 0.96 12/24/2020 11:22 AM    BUN/Creatinine ratio 13 12/24/2020 11:22 AM    GFR est AA >60 12/24/2020 11:22 AM    GFR est non-AA >60 12/24/2020 11:22 AM    Calcium 8.9 12/24/2020 11:22 AM    Bilirubin, total 0.8 12/24/2020 11:22 AM    Alk.  phosphatase 79 12/24/2020 11:22 AM    Protein, total 7.0 12/24/2020 11:22 AM    Albumin 3.5 12/24/2020 11:22 AM    Globulin 3.5 12/24/2020 11:22 AM    A-G Ratio 1.0 (L) 12/24/2020 11:22 AM ALT (SGPT) 30 12/24/2020 11:22 AM    AST (SGOT) 16 12/24/2020 11:22 AM     Lab Results   Component Value Date/Time    Cholesterol, total 129 12/24/2020 11:22 AM    HDL Cholesterol 54 12/24/2020 11:22 AM    LDL, calculated 33.6 12/24/2020 11:22 AM    VLDL, calculated 41.4 12/24/2020 11:22 AM    Triglyceride 207 (H) 12/24/2020 11:22 AM    CHOL/HDL Ratio 2.4 12/24/2020 11:22 AM        Assessment:    pt is a 52yo male here today for help with diabetes. Checking blood sugars daily. Since adding new medication smbg = 111mg/dl. Checking in the     Has not yet started exercise due to weather. Planning for 3 times per week. Sedentary job during the day. Sitting at home a lot too. Wants to use the treadmill for 45min but has some pain in leg (30 min instead) nad 15min for other exercises. Wt Readings from Last 3 Encounters:   02/26/21 86.2 kg (190 lb)   11/25/20 84.5 kg (186 lb 3.2 oz)   08/21/20 83.7 kg (184 lb 9.6 oz)   history of elevated cholesterol. Recent high Triglycerides. He has already made changes to reduce portion sizes before today. Food & Nutrition: Pt made changes before today;s session. Used to eat 2 slices wheat toast now 1. Made changes this week to reduce the amount of carbohydrate at meals. Looking at labels for calories and carbohydrates. Not aware that fruit has carbohydrates. Using hand to scoop and measure some foods like dry oatmeal. Not aware of need to read for serving sizes on labels to determine carbohydrate and calorie amounts. 3 meals per day. Sometimes 4th meal after if still hungry (oatmeal 1 cup + 2 cups milk = 70g)  Snacks: fruit or biscuit --3-4 snacks per day. Used to wake up at night unable to sleep and would eat at night. B- eggs + 1 slice bread whole grain instead of 2. S- few spoons of hummus before. Some hunger between meals since cutting back.    L- half chicken, with 2-3 spoons of rice, fruit OR 4 meetballs sometimes without rice and small orange or apple.  S- biscuits  D- soup, salad, little rice, chicken or beef. S- spoon of dessert sometimes (cake  Drinks: water, milk in coffee (12oz), tea sometimes with honey. Estimate Needs = Equation( [x] MSJ ; []  HBE; [] Mercedez [place]; [] other)  * Activity Factor (1.4 - planned increase)-250   Calories: 2050  Protein: 128 Carbs: 231 Fat: 68   Kcal/day  g/day  g/day  g/day        percent: 25  39  30               Nutrition Diagnosis Food and nutrition related knowledge deficit R/T lack of prior education for diabetes nutrition AEB request for counseling and pt expressing being unsure of how much he should eat. Nutrition Intervention &  Education: Educated pt on the pathophysiology of Type II Diabetes, insulin resistance and the rationale for dietary modifications and increased activity. Educated pt on lean proteins, healthy fats, non-starchy vegetables, and complex carbohydrate food sources. Discussed limiting carbohydrates, label reading, meal timing, and appropriate serving sizes. Set carbohydrate goals for meals and snacks. Encouraged to not eat when waking up in the middle of the night unless blood sugars are low (<70mg/dl). Answered questions about nutrition for cholesterol with regard to healthy fat and protein sources.     Handouts Provided: [x]  Carbohydrates  []  Protein  [x]  Non-starchy Vegatbles  []  Food Label  [x]  Meal and Snack Ideas  []  Food Journals [x]  Diabetes  []  Cholesterol  []  Sodium  []  Gen Nutr Guidelines  []  SBGM Guidelines  []  Others:   Information Reviewed with: Pt    Readiness to Change Stage: []  Pre-contemplative    []  Contemplative  []  Preparation               []  Action                  []  Maintenance   Potential Barriers to Learning:                      []  Decline in memory    []  Language barrier   []  Other:  []  Emotional                  []  Limited mobility     X none  []  Lack of motivation     [] Vision, hearing or cognitive impairment   Expected Compliance: Good due to changes already made     Nutritional Goal - To promote lifestyle changes to result in:    [x]  Weight loss (pt target 4-6# loss)  [x]  Improved diabetic control  [x]  Decreased cholesterol levels  []  Decreased blood pressure  []  Weight maintenance []  Preventing any interactions associated with food allergies  []  Adequate weight gain toward goal weight  []  Other:        Patient Goals:   - Improve consistency of CHO intake w/goal to plan meals with 50-60 gm CHO/meal and 2-4 optional snack of 15-20 gm. - Improve physical activity w/goal to go to the gym for 45-60 minutes 3 times per week.    -check blood sugars before eating and 4 hours after to see if your blood sugar came back down to what it started at. Aim for <130mg/dl before and 4 hours after. Dietitian Signature: Chelsie Block MS, RD, CSSD Date: 3/24/2021   Follow-up: 4 weeks Time: 4:13 PM   Quita López is a 50 y.o. male being evaluated by a Virtual Visit (video visit) encounter to address concerns as mentioned above. A caregiver was present when appropriate. Due to this being a TeleHealth encounter (During [place] [address] public health emergency), evaluation of the following areas was limited: Nutrition Focused Physical Exam. Pursuant to the emergency declaration under the [address] [place] [address], [address] [place] [address] authority and the [employer] Act, this Virtual Visit was conducted with patient's (and/or legal guardian's) consent, to reduce the risk of exposure to COVID-19 and provide necessary medical care. Pt provided verbal consent for electronic communication through e-mail. Services were provided through a video synchronous discussion virtually to substitute for in-person encounter. --Ericka Bishop on 3/24/2021 at 4:13 PM    An electronic signature was used to authenticate this note.

## 2021-03-25 ENCOUNTER — APPOINTMENT (OUTPATIENT)
Dept: NUTRITION | Age: 56
End: 2021-03-25
Payer: MEDICAID

## 2021-05-07 NOTE — TELEPHONE ENCOUNTER
Patient call requesting refill as 90 d/s. Updated if appropriate. Thanks, Edith Salomon    Last Visit: 2/26/21 Katty  Next Appointment: 5/24/21 Katty  Previous Refill Encounter(s): 2/26/21 120 + 3    Requested Prescriptions     Pending Prescriptions Disp Refills    metFORMIN ER (GLUCOPHAGE XR) 500 mg tablet 360 Tab 0     Sig: Take 4 Tabs by mouth Daily (before breakfast).

## 2021-05-10 RX ORDER — METFORMIN HYDROCHLORIDE 500 MG/1
2000 TABLET, EXTENDED RELEASE ORAL
Qty: 360 TAB | Refills: 3 | Status: SHIPPED | OUTPATIENT
Start: 2021-05-10 | End: 2021-10-20 | Stop reason: SDUPTHER

## 2021-06-18 DIAGNOSIS — E11.65 UNCONTROLLED TYPE 2 DIABETES MELLITUS WITH HYPERGLYCEMIA (HCC): ICD-10-CM

## 2021-06-18 DIAGNOSIS — E78.2 MIXED HYPERLIPIDEMIA: ICD-10-CM

## 2021-06-18 RX ORDER — METFORMIN HYDROCHLORIDE 500 MG/1
2000 TABLET, EXTENDED RELEASE ORAL
Qty: 360 TABLET | Refills: 3 | Status: CANCELLED | OUTPATIENT
Start: 2021-06-18

## 2021-06-18 RX ORDER — GLIPIZIDE 10 MG/1
TABLET, FILM COATED, EXTENDED RELEASE ORAL
Qty: 90 TABLET | Refills: 3 | Status: CANCELLED | OUTPATIENT
Start: 2021-06-18

## 2021-06-18 RX ORDER — GLIPIZIDE 5 MG/1
TABLET, FILM COATED, EXTENDED RELEASE ORAL
Qty: 90 TABLET | Refills: 3 | Status: CANCELLED | OUTPATIENT
Start: 2021-06-18

## 2021-06-18 RX ORDER — ATORVASTATIN CALCIUM 20 MG/1
20 TABLET, FILM COATED ORAL DAILY
Qty: 90 TABLET | Refills: 0 | Status: SHIPPED | OUTPATIENT
Start: 2021-06-18 | End: 2021-10-20 | Stop reason: SDUPTHER

## 2021-06-18 NOTE — TELEPHONE ENCOUNTER
.Patient is came into the office requesting a refill on the medication. Patient is requesting for a 90 day supple due to him going to [facility] and will run out of medication. .  Requested Prescriptions     Pending Prescriptions Disp Refills    empagliflozin (JARDIANCE) 10 mg tablet 30 Tablet 3     Sig: Take 1 Tablet by mouth daily.  glipiZIDE SR (GLUCOTROL XL) 10 mg CR tablet 90 Tablet 3     Sig: Take 1 tab by mouth every day in addition to the Glucotrol XL 5 mg for a total daily dose of 15 mg.    glipiZIDE SR (GLUCOTROL XL) 5 mg CR tablet 90 Tablet 3     Sig: Take 1 tab by mouth every day in addition to Glucotrol XL 10 mg for a total daily dose of 15 mg.    atorvastatin (LIPITOR) 20 mg tablet 30 Tablet 5     Sig: Take 1 Tablet by mouth daily. Metformin HCL 500mg        . Pharmacy on file verified        [facility] [phone]

## 2021-06-18 NOTE — TELEPHONE ENCOUNTER
Duplicate request:   42/65/[id number] Glucophage-XR #360 with 3 refills,  11/21/2020:   - Glucotrol-XL 5 mg & 10 mg #90 with 3 refills     Pt is requesting 90 day supplies. Pt is going to [facility]. Last visit 02/26/2021 LEVI Alaniz   Next appointment No show 05/24/2021  Previous refill encounter(s)   03/10/2021 Lipitor #30 with 5 refills,   03/15/2021 Jardiance #30 with 5 refills. Requested Prescriptions     Pending Prescriptions Disp Refills    empagliflozin (JARDIANCE) 10 mg tablet 90 Tablet 0     Sig: Take 1 Tablet by mouth daily.  glipiZIDE SR (GLUCOTROL XL) 10 mg CR tablet 90 Tablet 3     Sig: Take 1 tab by mouth every day in addition to the Glucotrol XL 5 mg for a total daily dose of 15 mg.    glipiZIDE SR (GLUCOTROL XL) 5 mg CR tablet 90 Tablet 3     Sig: Take 1 tab by mouth every day in addition to Glucotrol XL 10 mg for a total daily dose of 15 mg.    atorvastatin (LIPITOR) 20 mg tablet 90 Tablet 0     Sig: Take 1 Tablet by mouth daily.  metFORMIN ER (GLUCOPHAGE XR) 500 mg tablet 360 Tablet 3     Sig: Take 4 Tablets by mouth Daily (before breakfast).

## 2021-09-30 ENCOUNTER — TRANSCRIBE ORDER (OUTPATIENT)
Dept: SCHEDULING | Age: 56
End: 2021-09-30

## 2021-09-30 DIAGNOSIS — R10.13 EPIGASTRIC PAIN: ICD-10-CM

## 2021-09-30 DIAGNOSIS — R10.11 RUQ PAIN: ICD-10-CM

## 2021-09-30 DIAGNOSIS — R13.10 DYSPHAGIA: Primary | ICD-10-CM

## 2021-09-30 DIAGNOSIS — R19.4 CHANGE IN BOWEL HABITS: ICD-10-CM

## 2021-10-01 ENCOUNTER — OFFICE VISIT (OUTPATIENT)
Dept: FAMILY MEDICINE CLINIC | Age: 56
End: 2021-10-01
Payer: MEDICAID

## 2021-10-01 VITALS
OXYGEN SATURATION: 97 % | TEMPERATURE: 98 F | HEIGHT: 66 IN | WEIGHT: 182.6 LBS | HEART RATE: 73 BPM | SYSTOLIC BLOOD PRESSURE: 110 MMHG | DIASTOLIC BLOOD PRESSURE: 71 MMHG | BODY MASS INDEX: 29.35 KG/M2

## 2021-10-01 DIAGNOSIS — Z23 ENCOUNTER FOR IMMUNIZATION: ICD-10-CM

## 2021-10-01 DIAGNOSIS — R63.4 UNINTENDED WEIGHT LOSS: ICD-10-CM

## 2021-10-01 DIAGNOSIS — Z13.9 DUE FOR SCREENING: ICD-10-CM

## 2021-10-01 DIAGNOSIS — E11.00 TYPE 2 DIABETES MELLITUS WITH HYPEROSMOLARITY WITHOUT COMA, UNSPECIFIED WHETHER LONG TERM INSULIN USE (HCC): Primary | ICD-10-CM

## 2021-10-01 PROCEDURE — 90686 IIV4 VACC NO PRSV 0.5 ML IM: CPT | Performed by: STUDENT IN AN ORGANIZED HEALTH CARE EDUCATION/TRAINING PROGRAM

## 2021-10-01 PROCEDURE — 99204 OFFICE O/P NEW MOD 45 MIN: CPT | Performed by: STUDENT IN AN ORGANIZED HEALTH CARE EDUCATION/TRAINING PROGRAM

## 2021-10-01 NOTE — PROGRESS NOTES
Chief Complaint   Patient presents with    Medication Refill     Visit Vitals  /71 (BP 1 Location: Left upper arm, BP Patient Position: Sitting)   Pulse 73   Temp 98 °F (36.7 °C) (Oral)   Ht 5' 6\" (1.676 m)   Wt 182 lb 9.6 oz (82.8 kg)   SpO2 97%   BMI 29.47 kg/m²

## 2021-10-04 ENCOUNTER — LAB ONLY (OUTPATIENT)
Dept: FAMILY MEDICINE CLINIC | Age: 56
End: 2021-10-04

## 2021-10-04 DIAGNOSIS — E11.00 TYPE 2 DIABETES MELLITUS WITH HYPEROSMOLARITY WITHOUT COMA, UNSPECIFIED WHETHER LONG TERM INSULIN USE (HCC): ICD-10-CM

## 2021-10-04 DIAGNOSIS — R63.4 UNINTENDED WEIGHT LOSS: ICD-10-CM

## 2021-10-04 LAB
ALBUMIN SERPL-MCNC: 3.7 G/DL (ref 3.5–5)
ALBUMIN/GLOB SERPL: 1 {RATIO} (ref 1.1–2.2)
ALP SERPL-CCNC: 84 U/L (ref 45–117)
ALT SERPL-CCNC: 28 U/L (ref 12–78)
ANION GAP SERPL CALC-SCNC: 5 MMOL/L (ref 5–15)
AST SERPL-CCNC: 18 U/L (ref 15–37)
BILIRUB SERPL-MCNC: 1.2 MG/DL (ref 0.2–1)
BUN SERPL-MCNC: 20 MG/DL (ref 6–20)
BUN/CREAT SERPL: 19 (ref 12–20)
CALCIUM SERPL-MCNC: 9.3 MG/DL (ref 8.5–10.1)
CHLORIDE SERPL-SCNC: 102 MMOL/L (ref 97–108)
CHOLEST SERPL-MCNC: 124 MG/DL
CO2 SERPL-SCNC: 29 MMOL/L (ref 21–32)
CREAT SERPL-MCNC: 1.04 MG/DL (ref 0.7–1.3)
CREAT UR-MCNC: 61.2 MG/DL
ERYTHROCYTE [DISTWIDTH] IN BLOOD BY AUTOMATED COUNT: 13.2 % (ref 11.5–14.5)
EST. AVERAGE GLUCOSE BLD GHB EST-MCNC: 154 MG/DL
GLOBULIN SER CALC-MCNC: 3.6 G/DL (ref 2–4)
GLUCOSE SERPL-MCNC: 206 MG/DL (ref 65–100)
HBA1C MFR BLD: 7 % (ref 4–5.6)
HCT VFR BLD AUTO: 50.7 % (ref 36.6–50.3)
HDLC SERPL-MCNC: 51 MG/DL
HDLC SERPL: 2.4 {RATIO} (ref 0–5)
HGB BLD-MCNC: 16.1 G/DL (ref 12.1–17)
LDLC SERPL CALC-MCNC: 31.4 MG/DL (ref 0–100)
MCH RBC QN AUTO: 28.3 PG (ref 26–34)
MCHC RBC AUTO-ENTMCNC: 31.8 G/DL (ref 30–36.5)
MCV RBC AUTO: 89.1 FL (ref 80–99)
MICROALBUMIN UR-MCNC: 0.53 MG/DL
MICROALBUMIN/CREAT UR-RTO: 9 MG/G (ref 0–30)
NRBC # BLD: 0 K/UL (ref 0–0.01)
NRBC BLD-RTO: 0 PER 100 WBC
PLATELET # BLD AUTO: 182 K/UL (ref 150–400)
PMV BLD AUTO: 12.5 FL (ref 8.9–12.9)
POTASSIUM SERPL-SCNC: 4.1 MMOL/L (ref 3.5–5.1)
PROT SERPL-MCNC: 7.3 G/DL (ref 6.4–8.2)
RBC # BLD AUTO: 5.69 M/UL (ref 4.1–5.7)
SODIUM SERPL-SCNC: 136 MMOL/L (ref 136–145)
TRIGL SERPL-MCNC: 208 MG/DL (ref ?–150)
TSH SERPL DL<=0.05 MIU/L-ACNC: 2.36 UIU/ML (ref 0.36–3.74)
VLDLC SERPL CALC-MCNC: 41.6 MG/DL
WBC # BLD AUTO: 4.4 K/UL (ref 4.1–11.1)

## 2021-10-05 NOTE — PROGRESS NOTES
Robyn Hendrickson is an 50 y.o. male who presents to establish with us, get routine vaccines and follow up on his DM. No fever, chills, night sweats, anxiety, palpitations, CP, SOB, goiter. Patient endorses unintended weight loss. Says his appetite is good but he has lost 8 lb since February (confirmed via chart review)    Allergies - reviewed:   No Known Allergies      Medications - reviewed:   Current Outpatient Medications   Medication Sig    empagliflozin (JARDIANCE) 10 mg tablet Take 1 Tablet by mouth daily.  atorvastatin (LIPITOR) 20 mg tablet Take 1 Tablet by mouth daily.  metFORMIN ER (GLUCOPHAGE XR) 500 mg tablet Take 4 Tabs by mouth Daily (before breakfast).  glipiZIDE SR (GLUCOTROL XL) 10 mg CR tablet Take 1 tab by mouth every day in addition to the Glucotrol XL 5 mg for a total daily dose of 15 mg.    glipiZIDE SR (GLUCOTROL XL) 5 mg CR tablet Take 1 tab by mouth every day in addition to Glucotrol XL 10 mg for a total daily dose of 15 mg.    Omeprazole delayed release (PRILOSEC D/R) 20 mg tablet TAKE 1 TABLET BY MOUTH EVERY DAY    diclofenac EC (VOLTAREN) 75 mg EC tablet TAKE 1 TABLET BY MOUTH TWICE A DAY WITH MEALS    sildenafil citrate (VIAGRA) 25 mg tablet Take 1 Tab by mouth as needed (ED).  Blood-Glucose Meter monitoring kit Use to measure blood sugar 2 time daily and as needed.  Lancets misc Use to measure blood sugar 2 times daily and as needed    glucose blood VI test strips (ASCENSIA AUTODISC VI, ONE TOUCH ULTRA TEST VI) strip Use to measure blood sugar 2 times daily and as needed     No current facility-administered medications for this visit.          Past Medical History - reviewed:  Past Medical History:   Diagnosis Date    Diabetes ([facility] [address]     Esophageal reflux     reported by patient has had 2 endoscopies in [facility] liver     reported by patient seen on u/s in [facility] in foot, right 10/23/2017    Hypertension     Sleep apnea, obstructive 4/2014    AHI 32.3, does not have machine         Past Surgical History - reviewed:   No past surgical history on file. Social History - reviewed:  Social History     Socioeconomic History    Marital status:      Spouse name: Not on file    Number of children: Not on file    Years of education: Not on file    Highest education level: Not on file   Occupational History    Occupation: Accounting   Tobacco Use    Smoking status: Never Smoker    Smokeless tobacco: Never Used   Vaping Use    Vaping Use: Never used   Substance and Sexual Activity    Alcohol use: No    Drug use: No    Sexual activity: Yes     Partners: Female     Comment:    Other Topics Concern    Not on file   Social History Narrative    Not on file     Social Determinants of Health     Financial Resource Strain:     Difficulty of Paying Living Expenses:    Food Insecurity:     Worried About Running Out of Food in the Last Year:     [address] in the Last Year:    Transportation Needs:     Lack of Transportation (Medical):      Lack of Transportation (Non-Medical):    Physical Activity:     Days of Exercise per Week:     Minutes of Exercise per Session:    Stress:     Feeling of Stress :    Social Connections:     Frequency of Communication with Friends and Family:     Frequency of Social Gatherings with Friends and Family:     Attends [employer] Services:     Active Member of Clubs or Organizations:     Attends Club or Organization Meetings:     Marital Status:    Intimate Partner Violence:     Fear of Current or Ex-Partner:     Emotionally Abused:     Physically Abused:     Sexually Abused:          Family History - reviewed:  Family History   Problem Relation Age of Onset    Heart Disease Father          Immunizations - reviewed:   Immunization History   Administered Date(s) Administered    Influenza Vaccine (Quad) Mdck Pf (>4 Yrs Flucelvax QUAD [id number]) 11/17/2020    Influenza Vaccine Buzzoo) PF (>6 Mo Flulaval, Fluarix, and >3 Yrs Dolph Hardwicks [id number]) 11/18/2014, 08/27/2019, 10/01/2021    MMR 11/18/2014    Pneumococcal Polysaccharide (PPSV-23) 08/27/2019    TB Skin Test (PPD) Intradermal 11/18/2014    Tdap 11/18/2014         ROS  In HPI    Physical Exam  Visit Vitals  /71 (BP 1 Location: Left upper arm, BP Patient Position: Sitting)   Pulse 73   Temp 98 °F (36.7 °C) (Oral)   Ht 5' 6\" (1.676 m)   Wt 182 lb 9.6 oz (82.8 kg)   SpO2 97%   BMI 29.47 kg/m²       General appearance - alert, well appearing, and in no distress  Eyes - pupils equal and reactive, extraocular eye movements intact  Mouth - mucous membranes moist, pharynx normal without lesions  Neck - supple, no significant adenopathy  Chest - clear to auscultation, no wheezes, rales or rhonchi, symmetric air entry  Heart - normal rate, regular rhythm, normal S1, S2, no murmurs, rubs, clicks or gallops  Abdomen - soft, nontender, nondistended, no masses or organomegaly  Neurological - alert, oriented, normal speech, no focal findings or movement disorder noted  Musculoskeletal - no joint tenderness, deformity or swelling  Extremities - peripheral pulses normal, no pedal edema, no clubbing or cyanosis  Skin - normal coloration and turgor, no rashes, no suspicious skin lesions noted. No swollen LN    Diabetic foot exam:    Sensation by vibration sense:  Diminished throughout  Monofilament test:  good  Skin integrity:  intact without lesions  Vascular:  good circulation  Motor:  moves all toes, strength wnl      Assessment/Plan    ICD-10-CM ICD-9-CM    1.  Type 2 diabetes mellitus with hyperosmolarity without coma, unspecified whether long term insulin use (HCC)  E11.00 250.20 HEMOGLOBIN A1C WITH EAG      MICROALBUMIN, UR, RAND W/ MICROALB/CREAT RATIO      LIPID PANEL      METABOLIC PANEL, COMPREHENSIVE      CBC W/O DIFF      REFERRAL TO OPHTHALMOLOGY   2. Encounter for immunization  Z23 V03.89 INFLUENZA VIRUS VAC QUAD,SPLIT,PRESV FREE SYRINGE IM      DC IMMUNIZ ADMIN,1 SINGLE/COMB VAC/TOXOID   3. Due for screening  Z13.9 V82.9 REFERRAL TO GASTROENTEROLOGY   4. Unintended weight loss  R63.4 783.21 TSH 3RD GENERATION   DM2:  - Labs collected  - Foot exam performed  - Referred to optho for eye exam    Encounter for immunization:  - Immunization records reviewed  - Counseled on risks and benefits  - All due vaccines given    Unintended weight loss: 2/2 uncontrolled DM vs hyperthyroidism, vs neoplasm.  - TSH for hyperthyroidism  - CBC for hematological malignancies. No swollen LN  - Referred for colonoscopy to screen for colon cancer    RTC 4 weeks    I have discussed the diagnosis with the patient and the intended plan as seen in the above orders. Patient verbalized understanding of the plan and agrees with the plan. The patient has received an after-visit summary and questions were answered concerning future plans. I have discussed medication side effects and warnings with the patient as well. Informed patient to return to the office if new symptoms arise.         Sam Morrow MD  Family Medicine Resident

## 2021-10-20 DIAGNOSIS — E11.65 UNCONTROLLED TYPE 2 DIABETES MELLITUS WITH HYPERGLYCEMIA (HCC): ICD-10-CM

## 2021-10-20 DIAGNOSIS — E78.2 MIXED HYPERLIPIDEMIA: ICD-10-CM

## 2021-10-20 RX ORDER — METFORMIN HYDROCHLORIDE 500 MG/1
2000 TABLET, EXTENDED RELEASE ORAL
Qty: 360 TABLET | Refills: 3 | Status: SHIPPED | OUTPATIENT
Start: 2021-10-20

## 2021-10-20 RX ORDER — ATORVASTATIN CALCIUM 20 MG/1
20 TABLET, FILM COATED ORAL DAILY
Qty: 90 TABLET | Refills: 0 | Status: SHIPPED | OUTPATIENT
Start: 2021-10-20 | End: 2022-04-11

## 2021-10-20 RX ORDER — GLIPIZIDE 10 MG/1
TABLET, FILM COATED, EXTENDED RELEASE ORAL
Qty: 90 TABLET | Refills: 3 | Status: SHIPPED | OUTPATIENT
Start: 2021-10-20

## 2021-10-20 RX ORDER — GLIPIZIDE 5 MG/1
TABLET, FILM COATED, EXTENDED RELEASE ORAL
Qty: 90 TABLET | Refills: 3 | Status: SHIPPED | OUTPATIENT
Start: 2021-10-20 | End: 2022-03-09 | Stop reason: ALTCHOICE

## 2021-10-20 NOTE — TELEPHONE ENCOUNTER
Pt came into the office requesting refills for the following medicines, all for 90 days supply, stated that he saw you on October 1. Pt also states that he never received a call for lab results and would like to be contacted about them. He will be available after 12pm today if you could give him a call. His phone number in his chart is the correct number to contact him on.

## 2021-10-26 NOTE — PROGRESS NOTES
TSH wnl  A1C improved, now 7  Urine/microalb ratio wnl  Slightly elevated TAG otherwise normal lipid panel.  On mod intensity statin  CMP, CBC unremarkable

## 2021-11-24 RX ORDER — SILDENAFIL 25 MG/1
25 TABLET, FILM COATED ORAL AS NEEDED
Qty: 10 TABLET | Refills: 4 | Status: SHIPPED | OUTPATIENT
Start: 2021-11-24 | End: 2022-03-09 | Stop reason: SDUPTHER

## 2021-11-24 NOTE — TELEPHONE ENCOUNTER
Patient came in and requested to have this medication refilled but the refill request was sent to his previous doctor.

## 2021-11-30 ENCOUNTER — HOSPITAL ENCOUNTER (OUTPATIENT)
Age: 56
Setting detail: OUTPATIENT SURGERY
Discharge: HOME | End: 2021-11-30
Attending: INTERNAL MEDICINE | Admitting: INTERNAL MEDICINE
Payer: MEDICAID

## 2021-11-30 ENCOUNTER — ANESTHESIA EVENT (OUTPATIENT)
Dept: ENDOSCOPY | Age: 56
End: 2021-11-30
Payer: MEDICAID

## 2021-11-30 ENCOUNTER — ANESTHESIA (OUTPATIENT)
Dept: ENDOSCOPY | Age: 56
End: 2021-11-30
Payer: MEDICAID

## 2021-11-30 VITALS
HEART RATE: 73 BPM | OXYGEN SATURATION: 98 % | HEIGHT: 66 IN | WEIGHT: 185 LBS | TEMPERATURE: 97 F | SYSTOLIC BLOOD PRESSURE: 112 MMHG | DIASTOLIC BLOOD PRESSURE: 76 MMHG | BODY MASS INDEX: 29.73 KG/M2 | RESPIRATION RATE: 15 BRPM

## 2021-11-30 LAB
COVID-19 RAPID TEST, COVR: NOT DETECTED
GLUCOSE BLD STRIP.AUTO-MCNC: 145 MG/DL (ref 65–117)
SERVICE CMNT-IMP: ABNORMAL
SOURCE, COVRS: NORMAL

## 2021-11-30 PROCEDURE — 88305 TISSUE EXAM BY PATHOLOGIST: CPT

## 2021-11-30 PROCEDURE — 76060000031 HC ANESTHESIA FIRST 0.5 HR: Performed by: INTERNAL MEDICINE

## 2021-11-30 PROCEDURE — 77030021593 HC FCPS BIOP ENDOSC BSC -A: Performed by: INTERNAL MEDICINE

## 2021-11-30 PROCEDURE — 87635 SARS-COV-2 COVID-19 AMP PRB: CPT

## 2021-11-30 PROCEDURE — 76040000019: Performed by: INTERNAL MEDICINE

## 2021-11-30 PROCEDURE — 74011250636 HC RX REV CODE- 250/636: Performed by: NURSE ANESTHETIST, CERTIFIED REGISTERED

## 2021-11-30 PROCEDURE — 82962 GLUCOSE BLOOD TEST: CPT

## 2021-11-30 PROCEDURE — 74011000250 HC RX REV CODE- 250: Performed by: NURSE ANESTHETIST, CERTIFIED REGISTERED

## 2021-11-30 PROCEDURE — 88342 IMHCHEM/IMCYTCHM 1ST ANTB: CPT

## 2021-11-30 PROCEDURE — 2709999900 HC NON-CHARGEABLE SUPPLY: Performed by: INTERNAL MEDICINE

## 2021-11-30 RX ORDER — PANTOPRAZOLE SODIUM 40 MG/1
40 TABLET, DELAYED RELEASE ORAL
Qty: 90 TABLET | Refills: 3 | Status: SHIPPED | OUTPATIENT
Start: 2021-11-30 | End: 2022-02-28

## 2021-11-30 RX ORDER — PROPOFOL 10 MG/ML
INJECTION, EMULSION INTRAVENOUS AS NEEDED
Status: DISCONTINUED | OUTPATIENT
Start: 2021-11-30 | End: 2021-11-30 | Stop reason: HOSPADM

## 2021-11-30 RX ORDER — FLUMAZENIL 0.1 MG/ML
0.2 INJECTION INTRAVENOUS
Status: DISCONTINUED | OUTPATIENT
Start: 2021-11-30 | End: 2021-11-30 | Stop reason: HOSPADM

## 2021-11-30 RX ORDER — EPINEPHRINE 0.1 MG/ML
1 INJECTION INTRACARDIAC; INTRAVENOUS
Status: DISCONTINUED | OUTPATIENT
Start: 2021-11-30 | End: 2021-11-30 | Stop reason: HOSPADM

## 2021-11-30 RX ORDER — SODIUM CHLORIDE 9 MG/ML
50 INJECTION, SOLUTION INTRAVENOUS CONTINUOUS
Status: DISCONTINUED | OUTPATIENT
Start: 2021-11-30 | End: 2021-11-30 | Stop reason: HOSPADM

## 2021-11-30 RX ORDER — ATROPINE SULFATE 0.1 MG/ML
0.5 INJECTION INTRAVENOUS
Status: DISCONTINUED | OUTPATIENT
Start: 2021-11-30 | End: 2021-11-30 | Stop reason: HOSPADM

## 2021-11-30 RX ORDER — SODIUM CHLORIDE 0.9 % (FLUSH) 0.9 %
5-40 SYRINGE (ML) INJECTION AS NEEDED
Status: DISCONTINUED | OUTPATIENT
Start: 2021-11-30 | End: 2021-11-30 | Stop reason: HOSPADM

## 2021-11-30 RX ORDER — DEXTROMETHORPHAN/PSEUDOEPHED 2.5-7.5/.8
1.2 DROPS ORAL
Status: DISCONTINUED | OUTPATIENT
Start: 2021-11-30 | End: 2021-11-30 | Stop reason: HOSPADM

## 2021-11-30 RX ORDER — SODIUM CHLORIDE 9 MG/ML
INJECTION, SOLUTION INTRAVENOUS
Status: DISCONTINUED | OUTPATIENT
Start: 2021-11-30 | End: 2021-11-30 | Stop reason: HOSPADM

## 2021-11-30 RX ORDER — MIDAZOLAM HYDROCHLORIDE 1 MG/ML
.25-5 INJECTION, SOLUTION INTRAMUSCULAR; INTRAVENOUS
Status: DISCONTINUED | OUTPATIENT
Start: 2021-11-30 | End: 2021-11-30 | Stop reason: HOSPADM

## 2021-11-30 RX ORDER — NALOXONE HYDROCHLORIDE 0.4 MG/ML
0.4 INJECTION, SOLUTION INTRAMUSCULAR; INTRAVENOUS; SUBCUTANEOUS
Status: DISCONTINUED | OUTPATIENT
Start: 2021-11-30 | End: 2021-11-30 | Stop reason: HOSPADM

## 2021-11-30 RX ORDER — FENTANYL CITRATE 50 UG/ML
25-200 INJECTION, SOLUTION INTRAMUSCULAR; INTRAVENOUS
Status: DISCONTINUED | OUTPATIENT
Start: 2021-11-30 | End: 2021-11-30 | Stop reason: HOSPADM

## 2021-11-30 RX ORDER — SODIUM CHLORIDE 0.9 % (FLUSH) 0.9 %
5-40 SYRINGE (ML) INJECTION EVERY 8 HOURS
Status: DISCONTINUED | OUTPATIENT
Start: 2021-11-30 | End: 2021-11-30 | Stop reason: HOSPADM

## 2021-11-30 RX ORDER — LIDOCAINE HYDROCHLORIDE 20 MG/ML
INJECTION, SOLUTION EPIDURAL; INFILTRATION; INTRACAUDAL; PERINEURAL AS NEEDED
Status: DISCONTINUED | OUTPATIENT
Start: 2021-11-30 | End: 2021-11-30 | Stop reason: HOSPADM

## 2021-11-30 RX ADMIN — PROPOFOL 100 MG: 10 INJECTION, EMULSION INTRAVENOUS at 10:09

## 2021-11-30 RX ADMIN — PROPOFOL 50 MG: 10 INJECTION, EMULSION INTRAVENOUS at 10:17

## 2021-11-30 RX ADMIN — SODIUM CHLORIDE: 900 INJECTION, SOLUTION INTRAVENOUS at 10:05

## 2021-11-30 RX ADMIN — PROPOFOL 50 MG: 10 INJECTION, EMULSION INTRAVENOUS at 10:21

## 2021-11-30 RX ADMIN — PROPOFOL 50 MG: 10 INJECTION, EMULSION INTRAVENOUS at 10:14

## 2021-11-30 RX ADMIN — PROPOFOL 50 MG: 10 INJECTION, EMULSION INTRAVENOUS at 10:11

## 2021-11-30 RX ADMIN — PROPOFOL 50 MG: 10 INJECTION, EMULSION INTRAVENOUS at 10:26

## 2021-11-30 RX ADMIN — LIDOCAINE HYDROCHLORIDE 50 MG: 20 INJECTION, SOLUTION EPIDURAL; INFILTRATION; INTRACAUDAL; PERINEURAL at 10:09

## 2021-11-30 NOTE — PROGRESS NOTES
[facility]  1965  [id number]    Situation:  Verbal report received from: shanti Avina RN  Procedure: Procedure(s):  COLONOSCOPY  ESOPHAGOGASTRODUODENOSCOPY (EGD)  ESOPHAGOGASTRODUODENAL (EGD) BIOPSY  COLON BIOPSY    Background:    Preoperative diagnosis: CHANGE IN BOWEL HABITS, ABDOMINAL PAIN  Postoperative diagnosis: Small Hiatal Hernia  Esophagitis  Normal Colonoscopy    :  Dr. Alyson Grossman  Assistant(s): Endoscopy Technician-1: Ana Richards  Endoscopy RN-1: Kobe Akins RN    Specimens:   ID Type Source Tests Collected by Time Destination   1 : Duodenum bx Preservative Duodenum  Nish Kim MD 11/30/2021 1013 Pathology   2 : Gastric bx Preservative Gastric  Nish Kim MD 11/30/2021 1015 Pathology   3 : Random Esophagus bx Preservative Esophagus  Nish Kim MD 11/30/2021 1016 Pathology   4 : Random Colon bx Preservative Colon  Nish Kim MD 11/30/2021 1023 Pathology     H. Pylori  no      Anesthesia gave intra-procedure sedation and medications, see anesthesia flow sheet yes    Intravenous fluids: NS@ KVO     Vital signs stable     Abdominal assessment: round and soft     Recommendation:  Discharge patient per MD order.   Family in waiting room  Permission to share finding with family or friend no

## 2021-11-30 NOTE — PROGRESS NOTES

## 2021-11-30 NOTE — H&P
The patient is a 50year old male who presents with a complaint of Abdominal Pain. Note for \"Abdominal Pain\": Patient is a 50year old male who presents today for evaluation of upper abdominal pain. He is a diabetic on jardiance, metformin and glipizide. No recent abdominal imaging in epic. He is from [facility]- last traveled there in August.    He reports epigastric pain when he gets hungry and when he lies flat. Started this year. He cannot describe the pain. He also reports RUQ pain which he has had for many years. He has had his liver imaged and was told it was fine. He still has his gallbladder. It comes and goes. He notes sometimes worse after eating. He has regurgitation of food at times but no nausea/vomiting. He has experienced dysphagia in the last year. No trouble with pills or liquids. Feels stuck at base of neck. Very rare NSAID use for headaches. No early satiety. He has a BM daily- 3-4 times a day; this started when he started diabetes medication. He has not had a colonoscopy before. He has had 2 normal EGDs in the past in the 80's and again in 2002. He denies blood in the stool or black/tarry stool. he has lost some weight in the last 7 months-- about 10 lbs unintentionally. No family hx of colon cancer. No alcohol, tobacco, or drug use. He works as an . Problem List/Past Medical (Lise Bowman; 9/30/2021 8:40 AM)  Diabetes Mellitus, Type II      Past Surgical History (Lise Bowman; 9/30/2021 8:40 AM)  None   [05/03/2021]: Allergies (Lise Bowman; 9/30/2021 8:40 AM)  No Known Drug Allergies   [05/03/2021]:  No Known Allergies   [05/03/2021]:    Medication History (Lise Bowman; 9/30/2021 8:41 AM)  metFORMIN HCl  (500MG Tablet, Oral daily) Active. Jardiance  (10MG Tablet, Oral daily) Active. glipiZIDE ER  (2.5MG Tablet ER 24HR, Oral daily) Active. Atorvastatin Calcium  (10MG Tablet, Oral daily) Active. Medications Reconciled     Family History (Lise Wong. [place]; 9/30/2021 8:40 AM)  None   [05/03/2021]:    Social History (Patricia Bowman; 9/30/2021 8:40 AM)  Tobacco Use   Never smoker. Marital status   . Blood Transfusion   No.    Health Maintenance History (Patricia Bowman; 9/30/2021 8:41 AM)  Flu Vaccine   2020  COVID-19 vaccine   2021        Review of Systems (Patricia Bowman; 9/30/2021 8:40 AM)  General Not Present- Chronic Fatigue, Poor Appetite, Weight Gain and Weight Loss. Skin Not Present- Itching, Rash and Skin Color Changes. HEENT Not Present- Hearing Loss and Vertigo. Respiratory Not Present- Difficulty Breathing and TB exposure. Cardiovascular Not Present- Chest Pain, Use of Antibiotics before Dental Procedures and Use of Blood Thinners. Gastrointestinal Present- See HPI. Musculoskeletal Not Present- Arthritis, Hip Replacement Surgery and Knee Replacement Surgery. Neurological Not Present- Weakness. Psychiatric Not Present- Depression. Endocrine Not Present- Diabetes and Thyroid Problems. Hematology Not Present- Anemia. Vitals (Patricia Bowman; 9/30/2021 8:40 AM)  9/30/2021 8:39 AM  Weight: 181 lb   Height: 65 in   Body Surface Area: 1.9 m²   Body Mass Index: 30.12 kg/m²    Temp.: 97.5° F    Pulse: 89 (Regular)     BP: 127/78(Sitting, Left Arm, Standard)              Physical Exam (Amirah Hirsch PA-C; 9/30/2021 9:15 AM)  General  Mental Status - Alert. General Appearance - Cooperative, Pleasant, Not in acute distress. Build & Nutrition - Overweight. Voice - Normal.    Eye  Eyeball - Left - No Exophthalmos - Left. Eyeball - Right - No Exophthalmos - Right. Sclera/Conjunctiva - Left - No Jaundice - Left. Sclera/Conjunctiva - Right - No Jaundice - Right.     Chest and Lung Exam  Chest and lung exam reveals  - normal excursion with symmetric chest walls, quiet, even and easy respiratory effort with no use of accessory muscles and on auscultation, normal breath sounds, no adventitious sounds and normal vocal resonance. Cardiovascular  Cardiovascular examination reveals  - no digital clubbing, cyanosis, edema, increased warmth or tenderness. Auscultation  Heart Sounds - S1 WNL and S2 WNL. Murmurs & Other Heart Sounds - Auscultation of the heart reveals - No Murmurs. Abdomen  Inspection  Inspection of the abdomen reveals - Non-distended. Palpation/Percussion  Tenderness - Non-Tender. Rebound tenderness - No rebound. Liver - Normal size palpable at right costal margin. Spleen - Other Characteristics - Non Palpable. Abdominal Mass Palpable - No masses. Other Characteristics - No Ascites. Organomegally - None. Auscultation  Auscultation of the abdomen reveals - Bowel sounds normal.        Assessment & Plan (Amirah Hirsch PA-C; 9/30/2021 9:15 AM)  Dysphagia (R13.10)  Impression: dysphagia base of throat with solids only for several months now  associated epigastric pain and RUQ pain which comes and goes  Recommended GERD diet and trial of PPI  Chew food thoroughly with frequent sips of water  EGD to evaluate for EOE, stricture, etc.  Current Plans  Started Pantoprazole Sodium 40 MG Oral Tablet Delayed Release, 1 (one) Tablet 30 minutes before breakfast, #30, 30 days starting 09/30/2021, Ref. x3.  Epigastric pain (R10.13)  Impression: non-tender on abdomen. coming and going for several months. cannot describe- just says it does not feel right. hurts more when hungry. rare NSAID use. has had labs with PCP- told normal. Will check EGD to evaluate for PUD/gastritis/H. pylori.  trial of PPI  Current Plans  Endoscopy (77272) (Discussed risks and benefits with the patient to include: perforation, post polypectomy, or post biopsy bleeding, missed lesions, and sedation reactions.)  Change in bowel habit (R19.4)  Impression: Went from daily BM to 3 BMs a day since starting diabetes medication  no prior colon  recommend to r/o polyp, malignancy  Current Plans  Colonoscopy (50797) (Discussed risks and benefits with the patient to include:; perforation, post polypectomy, or post biopsy bleeding, missed lesions, and sedation reactions.)  Started MiraLax 17 GM/SCOOP Oral Powder, 1 (one) bottle as directed, 238 Kit, 1 day starting 09/30/2021, No Refill. Local Order:  Pharmacist Notes: patient will be given directions  RUQ pain (R10.11)  Impression: coming and going for years. previous imaging he has been told was normal- several years ago  will check HIDA  Current Plans  HIDA SCAN WITH CHOLECYSTOKININ (51838)  Diabetes Mellitus, Type II  Impression: Take jardiance and atorvastatin day of prep. hold all others. Hold all DMII meds morning of procedure  Current Plans  Pt Education - How to access health information online: discussed with patient and provided information. Patient is to call me for any questions or concerns. Date of Surgery Update:  Zollie Agent was seen and examined. History and physical has been reviewed. The patient has been examined. There have been no significant clinical changes since the completion of the originally dated History and Physical.  The patient was counseled at length about the risks of harsha Covid-19 in the diann-operative and post-operative states including the recovery window of their procedure. The patient was made aware that harsha Covid-19 after a surgical procedure may worsen their prognosis for recovering from the virus and lend to a higher morbidity and or mortality risk. The patient was given the options of postponing their procedure. All of the risks, benefits, and alternatives were discussed. The patient does  wish to proceed with the procedure.     Signed By: Lars Evans MD     November 30, 2021 9:57 AM

## 2021-11-30 NOTE — ANESTHESIA POSTPROCEDURE EVALUATION
Procedure(s):  COLONOSCOPY  ESOPHAGOGASTRODUODENOSCOPY (EGD)  ESOPHAGOGASTRODUODENAL (EGD) BIOPSY  COLON BIOPSY. MAC    Anesthesia Post Evaluation        Patient participation: complete - patient participated  Level of consciousness: awake  Pain management: adequate  Airway patency: patent  Anesthetic complications: no  Cardiovascular status: hemodynamically stable  Respiratory status: acceptable  Hydration status: acceptable  Comments: The patient is ready for PACU discharge. Sandhya Odonnell DO                   Post anesthesia nausea and vomiting:  controlled      INITIAL Post-op Vital signs:   Vitals Value Taken Time   /76 11/30/21 1055   Temp 36.1 °C (97 °F) 11/30/21 1038   Pulse 71 11/30/21 1059   Resp 16 11/30/21 1059   SpO2 98 % 11/30/21 1059   Vitals shown include unvalidated device data.

## 2021-11-30 NOTE — PROCEDURES
[address] [facility]  [address] [facility], [address]        Esophago- Gastroduodenoscopy (EGD) Procedure Note    Andrea Valero  1965  [id number]      Procedure: Endoscopic Gastroduodenoscopy with biopsy    Indication:  Abdominal pain, epigastric, Dysphagia/odynophagia, GERD     Pre-operative Diagnosis: see indication above    Post-operative Diagnosis: see findings below    : Keila Barron MD    Surgical Assistant: Endoscopy Technician-1: Melanie Pyle  Endoscopy RN-1: Negrita David RN    Implants:  None    Referring Provider:  Desiree Wood NP      Anesthesia/Sedation:  MAC anesthesia Propofol        Procedure Details     After infomed consent was obtained for the procedure, with all risks and benefits of procedure explained the patient was taken to the endoscopy suite and placed in the left lateral decubitus position. Following sequential administration of sedation as per above, the endoscope was inserted into the mouth and advanced under direct vision to third portion of the duodenum. A careful inspection was made as the gastroscope was withdrawn, including a retroflexed view of the proximal stomach; findings and interventions are described below. Findings:   Esophagus:hiatal hernia 3 cm in size   Grade 2 erosive esophagitis at G-E junction  Rest of esophagus was normal, random biopsies taken   Stomach: normal   Random biopsies taken   Duodenum: normal, random biopsies taken       Therapies:  none    Specimens: as above         EBL: None      Complications:   None; patient tolerated the procedure well. Impression:    -See post-procedure diagnoses.     Recommendations:  -No NSAIDS, -did not fill protonix prescription, will reorder it  -colonoscopy today  -awaiting HIDA scan results    Signed By: Keila Barron MD     11/30/2021  10:32 AM

## 2021-11-30 NOTE — ANESTHESIA PREPROCEDURE EVALUATION
Relevant Problems   No relevant active problems       Anesthetic History   No history of anesthetic complications            Review of Systems / Medical History  Patient summary reviewed, nursing notes reviewed and pertinent labs reviewed    Pulmonary        Sleep apnea           Neuro/Psych   Within defined limits           Cardiovascular    Hypertension                   GI/Hepatic/Renal           Liver disease     Endo/Other    Diabetes         Other Findings              Physical Exam    Airway  Mallampati: II  TM Distance: > 6 cm  Neck ROM: normal range of motion   Mouth opening: Normal     Cardiovascular  Regular rate and rhythm,  S1 and S2 normal,  no murmur, click, rub, or gallop             Dental  No notable dental hx       Pulmonary  Breath sounds clear to auscultation               Abdominal  GI exam deferred       Other Findings            Anesthetic Plan    ASA: 3  Anesthesia type: MAC            Anesthetic plan and risks discussed with: Patient

## 2021-11-30 NOTE — PROCEDURES
[address], [address] [facility]      Colonoscopy Operative Report    Cj Agent  [id number]  1965      Procedure Type:   Colonoscopy with biopsy     Indications:    Change in bowel habits, Diarrhea       Pre-operative Diagnosis: see indication above    Post-operative Diagnosis:  See findings below    :  Lars Evans MD    Surgical Assistant: Endoscopy Technician-1: Lennox Shelling  Endoscopy RN-1: Valerio Aleman RN    Implants:  None    Referring Provider: Nash Chang, NP      Sedation:  MAC anesthesia Propofol      Procedure Details:  After informed consent was obtained with all risks and benefits of procedure explained and preoperative exam completed, the patient was taken to the endoscopy suite and placed in the left lateral decubitus position. Upon sequential sedation as per above, a digital rectal exam was performed demonstrating internal hemorrhoids. The Olympus videocolonoscope  was inserted in the rectum and carefully advanced to the cecum, which was identified by the ileocecal valve and appendiceal orifice, terminal ileum. The cecum was identified by the ileocecal valve and appendiceal orifice. The quality of preparation was good. The colonoscope was slowly withdrawn with careful evaluation between folds. Retroflexion in the rectum was completed . Findings:   Rectum: normal  Sigmoid: normal  Descending Colon: normal  Transverse Colon: normal  Ascending Colon: normal  Cecum: normal  Terminal Ileum: normal    Random colonic biopsies       Specimen Removed:  as above    Complications: None. EBL:  None. Impression:    see findings    Recommendations: --Await pathology.       Recommendation for next colonscopy in 10 years  F/u in 2 months  SUSPECT HIS DIARRHEA from his diabetes medictions    Signed By: Lars Evans MD     11/30/2021  10:35 AM

## 2021-11-30 NOTE — DISCHARGE INSTRUCTIONS
[address], [address] [place] [address]    EGD and COLON DISCHARGE INSTRUCTIONS    Dax Muhammad  [id number]  1965    Discomfort:  Redness at IV site- apply warm compress to area; if redness or soreness persist- contact your physician  There may be a slight amount of blood passed from the rectum  Gaseous discomfort- walking, belching will help relieve any discomfort  You may not operate a vehicle for 12 hours  You may not engage in an occupation involving machinery or appliances for rest of today  You may not drink alcoholic beverages for at least 12 hours  Avoid making any critical decisions for at least 24 hour  DIET:  You may resume your regular diet - however -  remember your colon is empty and a heavy meal will produce gas. Avoid these foods:  vegetables, fried / greasy foods, carbonated drinks     ACTIVITY:  You may  resume your normal daily activities it is recommended that you spend the remainder of the day resting -  avoid any strenuous activity. CALL M.D. ANY SIGN OF:   Increasing pain, nausea, vomiting  Abdominal distension (swelling)  New increased bleeding (oral or rectal)  Fever (chills)  Pain in chest area  Bloody discharge from nose or mouth  Shortness of breath      Follow-up Instructions:   Call Dr. Stephen Rico for any questions or problems at [phone] and follow up with him in 2 months  START TAKING PROTONIX DAILY          ENDOSCOPY FINDINGS:   Your colonoscopy was normal   Your endoscopy showed small hiatal hernia and esophagitis ( inflammation in esophagus from acid reflux), rest of exam was normal, biopsies taken.   Telephone # [phone]      Signed By: Stephen Rico MD     11/30/2021  10:38 AM       DISCHARGE SUMMARY from Nurse    The following personal items collected during your admission are returned to you:   Dental Appliance: Dental Appliances: None  Vision: Visual Aid: None  Hearing Aid:    Jewelry:    Clothing:    Other Valuables:    Valuables sent to safe:        Learning About Coronavirus (COVID-19)  Coronavirus (COVID-19): Overview  What is coronavirus (COVID-19)? The coronavirus disease (COVID-19) is caused by a virus. It is an illness that was first [facility]. It has since spread worldwide. The virus can cause fever, cough, and trouble breathing. In severe cases, it can cause pneumonia and make it hard to breathe without help. It can cause death. Coronaviruses are a large group of viruses. They cause the common cold. They also cause more serious illnesses like Middle East respiratory syndrome (MERS) and severe acute respiratory syndrome (SARS). COVID-19 is caused by a novel coronavirus. That means it's a new type that has not been seen in people before. This virus spreads person-to-person through droplets from coughing and sneezing. It can also spread when you are close to someone who is infected. And it can spread when you touch something that has the virus on it, such as a doorknob or a tabletop. What can you do to protect yourself from coronavirus (COVID-19)? The best way to protect yourself from getting sick is to:  · Avoid areas where there is an outbreak. · Avoid contact with people who may be infected. · Wash your hands often with soap or alcohol-based hand sanitizers. · Avoid crowds and try to stay at least 6 feet away from other people. · Wash your hands often, especially after you cough or sneeze. Use soap and water, and scrub for at least 20 seconds. If soap and water aren't available, use an alcohol-based hand . · Avoid touching your mouth, nose, and eyes. What can you do to avoid spreading the virus to others? To help avoid spreading the virus to others:  · Cover your mouth with a tissue when you cough or sneeze. Then throw the tissue in the trash. · Use a disinfectant to clean things that you touch often.   · Stay home if you are sick or have been exposed to the virus. Don't go to school, work, or public areas. And don't use public transportation. · If you are sick:  ? Leave your home only if you need to get medical care. But call the doctor's office first so they know you're coming. And wear a face mask, if you have one.  ? If you have a face mask, wear it whenever you're around other people. It can help stop the spread of the virus when you cough or sneeze. ? Clean and disinfect your home every day. Use household  and disinfectant wipes or sprays. Take special care to clean things that you grab with your hands. These include doorknobs, remote controls, phones, and handles on your refrigerator and microwave. And don't forget countertops, tabletops, bathrooms, and computer keyboards. When to call for help  Call 911 anytime you think you may need emergency care. For example, call if:  · You have severe trouble breathing. (You can't talk at all.)  · You have constant chest pain or pressure. · You are severely dizzy or lightheaded. · You are confused or can't think clearly. · Your face and lips have a blue color. · You pass out (lose consciousness) or are very hard to wake up. Call your doctor now if you develop symptoms such as:  · Shortness of breath. · Fever. · Cough. If you need to get care, call ahead to the doctor's office for instructions before you go. Make sure you wear a face mask, if you have one, to prevent exposing other people to the virus. Where can you get the latest information? The following health organizations are tracking and studying this virus. Their websites contain the most up-to-date information. Dakotah Gauthier also learn what to do if you think you may have been exposed to the virus. · U.S. Centers for Disease Control and Prevention (CDC): The CDC provides updated news about the disease and travel advice. The website also tells you how to prevent the spread of infection.  www.cdc.gov  · World Health Organization [facility]): WHO offers information about the virus outbreaks. WHO also has travel advice. www.who.int  Current as of: April 1, 2020               Content Version: 12.4  © 2006-2020 Healthwise, Incorporated. Care instructions adapted under license by your healthcare professional. If you have questions about a medical condition or this instruction, always ask your healthcare professional. [address] any warranty or liability for your use of this information.

## 2021-12-03 RX ORDER — SILDENAFIL 25 MG/1
25 TABLET, FILM COATED ORAL AS NEEDED
Qty: 10 TABLET | Refills: 4 | OUTPATIENT
Start: 2021-12-03

## 2021-12-15 LAB — HBA1C MFR BLD HPLC: 8.2 %

## 2022-02-12 RX ORDER — EMPAGLIFLOZIN 10 MG/1
TABLET, FILM COATED ORAL
Qty: 30 TABLET | Refills: 0 | Status: SHIPPED | OUTPATIENT
Start: 2022-02-12 | End: 2022-03-09 | Stop reason: SDUPTHER

## 2022-02-13 NOTE — TELEPHONE ENCOUNTER
Will provide 1 month supply if jardiance. [address] [facility] office informed to call patient to make appointment for further refills.

## 2022-02-16 ENCOUNTER — TELEPHONE (OUTPATIENT)
Dept: FAMILY MEDICINE CLINIC | Age: 57
End: 2022-02-16

## 2022-02-16 NOTE — TELEPHONE ENCOUNTER
Received in-basket from :    Please reach out to patient to get him to come in for DM follow up. Kuldeep Richard MD       I called pt to get scheduled but no answer. Left vm.

## 2022-03-09 ENCOUNTER — OFFICE VISIT (OUTPATIENT)
Dept: FAMILY MEDICINE CLINIC | Age: 57
End: 2022-03-09
Payer: MEDICAID

## 2022-03-09 VITALS
HEART RATE: 85 BPM | TEMPERATURE: 98 F | WEIGHT: 187 LBS | RESPIRATION RATE: 16 BRPM | BODY MASS INDEX: 30.05 KG/M2 | HEIGHT: 66 IN | OXYGEN SATURATION: 97 % | DIASTOLIC BLOOD PRESSURE: 70 MMHG | SYSTOLIC BLOOD PRESSURE: 120 MMHG

## 2022-03-09 DIAGNOSIS — E11.65 UNCONTROLLED TYPE 2 DIABETES MELLITUS WITH HYPERGLYCEMIA (HCC): Primary | ICD-10-CM

## 2022-03-09 DIAGNOSIS — N52.9 ERECTILE DYSFUNCTION, UNSPECIFIED ERECTILE DYSFUNCTION TYPE: ICD-10-CM

## 2022-03-09 LAB — HBA1C MFR BLD HPLC: 7.3 %

## 2022-03-09 PROCEDURE — 83036 HEMOGLOBIN GLYCOSYLATED A1C: CPT | Performed by: NURSE PRACTITIONER

## 2022-03-09 PROCEDURE — 3051F HG A1C>EQUAL 7.0%<8.0%: CPT | Performed by: NURSE PRACTITIONER

## 2022-03-09 PROCEDURE — 99214 OFFICE O/P EST MOD 30 MIN: CPT | Performed by: NURSE PRACTITIONER

## 2022-03-09 RX ORDER — SILDENAFIL 25 MG/1
25 TABLET, FILM COATED ORAL AS NEEDED
Qty: 10 TABLET | Refills: 4 | Status: SHIPPED | OUTPATIENT
Start: 2022-03-09 | End: 2022-09-27 | Stop reason: SDUPTHER

## 2022-03-09 NOTE — PROGRESS NOTES
Chief Complaint   Patient presents with    Hypertension     follow up     Cholesterol Problem    Diabetes    Other     had endoscopy completed and was given medication for gerd symptoms      1. Have you been to the ER, urgent care clinic since your last visit? Hospitalized since your last visit? No    2. Have you seen or consulted any other health care providers outside of the [address] [place] [address] since your last visit? Include any pap smears or colon screening.      Yes, Dr. Steven Joseph for endoscopy

## 2022-03-14 NOTE — PROGRESS NOTES
Assessment/Plan:     Diagnoses and all orders for this visit:    1. Uncontrolled type 2 diabetes mellitus with hyperglycemia (HCC)  -     AMB POC HEMOGLOBIN A1C  -     REFERRAL TO PULMONARY DISEASE  -     empagliflozin (Jardiance) 25 mg tablet; Take 1 Tablet by mouth daily. -      DIABETES FOOT EXAM  Improving. Increase Jardiance as above. I have encouraged more routine aerobic exercise. Follow up in three months or sooner as needed. 2. Erectile dysfunction, unspecified erectile dysfunction type  -     sildenafil citrate (VIAGRA) 25 mg tablet; Take 1 Tablet by mouth as needed (ED). Stable. Refilled today. Continue current therapy. Discussed expected course/resolution/complications of diagnosis in detail with patient. Medication risks/benefits/costs/interactions/alternatives discussed with patient. Pt was given after visit summary which includes diagnoses, current medications & vitals. Pt expressed understanding with the diagnosis and plan          Subjective:      Derik Mane is a 49 y.o. male who presents for had concerns including Hypertension (follow up ), Cholesterol Problem, Diabetes, and Other (had endoscopy completed and was given medication for gerd symptoms ). Cardiovascular Review:  The patient has diabetes, hyperlipidemia and obesity. Diet and Lifestyle: generally follows a low fat low cholesterol diet  Home BP Monitoring: is not measured at home. Pertinent ROS: taking medications as instructed, no medication side effects noted, no TIA's, no chest pain on exertion, no dyspnea on exertion, no swelling of ankles.      Patient Active Problem List   Diagnosis Code    Uncontrolled type 2 diabetes circulatory disorder erectile dysfunction (HCC) E11.59, N52.1, E11.65    Sleep apnea, obstructive G47.33    TB lung, latent Z22.7    Insomnia G47.00    Mixed hyperlipidemia E78.2    Peripheral vascular disease ([facility] [address] I73.9    Uncontrolled type 2 diabetes mellitus with hyperglycemia ([facility] [address] E11.65    Obesity (BMI 30-39. 9) E66.9    Esophageal reflux K21.9    Fatty liver K76.0    Hypertension I10       Current Outpatient Medications   Medication Sig Dispense Refill    sildenafil citrate (VIAGRA) 25 mg tablet Take 1 Tablet by mouth as needed (ED). 10 Tablet 4    empagliflozin (Jardiance) 25 mg tablet Take 1 Tablet by mouth daily. 90 Tablet 3    metFORMIN ER (GLUCOPHAGE XR) 500 mg tablet Take 4 Tablets by mouth Daily (before breakfast). 360 Tablet 3    glipiZIDE SR (GLUCOTROL XL) 10 mg CR tablet Take 1 tab by mouth every day in addition to the Glucotrol XL 5 mg for a total daily dose of 15 mg. 90 Tablet 3    atorvastatin (LIPITOR) 20 mg tablet Take 1 Tablet by mouth daily. 90 Tablet 0    diclofenac EC (VOLTAREN) 75 mg EC tablet TAKE 1 TABLET BY MOUTH TWICE A DAY WITH MEALS 60 Tab 3    Blood-Glucose Meter monitoring kit Use to measure blood sugar 2 time daily and as needed. 1 kit 0    Lancets misc Use to measure blood sugar 2 times daily and as needed 1 Package 11    glucose blood VI test strips (ASCENSIA AUTODISC VI, ONE TOUCH ULTRA TEST VI) strip Use to measure blood sugar 2 times daily and as needed 1 Package 11       No Known Allergies    ROS:   Review of Systems   Constitutional: Negative for malaise/fatigue. Eyes: Negative for blurred vision. Respiratory: Negative for shortness of breath. Cardiovascular: Negative for chest pain. Objective:     Visit Vitals  /70 (BP 1 Location: Right arm, BP Patient Position: Sitting, BP Cuff Size: Adult long)   Pulse 85   Temp 98 °F (36.7 °C) (Temporal)   Resp 16   Ht 5' 6.14\" (1.68 m)   Wt 187 lb (84.8 kg)   SpO2 97%   BMI 30.05 kg/m²       Vitals and Nurse Documentation reviewed. Physical Exam  Constitutional:       General: He is not in acute distress. Cardiovascular:      Heart sounds: S1 normal and S2 normal. No murmur heard. No friction rub. No gallop. Pulmonary:      Effort: No respiratory distress. Breath sounds: Normal breath sounds. Feet:      Right foot:      Protective Sensation: 4 sites tested. 4 sites sensed. Left foot:      Protective Sensation: 4 sites tested. 4 sites sensed. Skin:     General: Skin is warm and dry.    Psychiatric:         Mood and Affect: Mood and affect normal.

## 2022-03-15 ENCOUNTER — TELEPHONE (OUTPATIENT)
Dept: FAMILY MEDICINE CLINIC | Age: 57
End: 2022-03-15

## 2022-03-15 NOTE — TELEPHONE ENCOUNTER
Patient called and updated pharmacy information for [address].. Patient wants all of his medication transferred to the updated pharmacy.     Best call back# [phone]

## 2022-03-18 NOTE — TELEPHONE ENCOUNTER
Chief Complaint   Patient presents with    Other     Pharmacy change     Pharmacy change to [facility] [address]. Will YAO Rizo    Patient informed via Sul[facility] he can request pharmacy to transfer prescription.   Harvinder Rizo LPN

## 2022-03-21 ENCOUNTER — TELEPHONE (OUTPATIENT)
Dept: FAMILY MEDICINE CLINIC | Age: 57
End: 2022-03-21

## 2022-03-21 NOTE — TELEPHONE ENCOUNTER
Chief Complaint   Patient presents with    Prior Auth     sildenafil citrate 25 mg tablet :  DENIED. Plan does not cover the prescribed medication as medication used for treatment of sexual dysfunction or erectile dysfunction are not covered benefits for this plan. Clark Sosa  coupon for 10 tablets:  [employer] $8.16, [facility] [email or web address] and [employer] $11.30 and [employer] $6.66. Veena Sahu LPN    Patient sent information via [employer].   Veena Sahu LPN

## 2022-04-08 DIAGNOSIS — E78.2 MIXED HYPERLIPIDEMIA: ICD-10-CM

## 2022-04-11 RX ORDER — ATORVASTATIN CALCIUM 20 MG/1
20 TABLET, FILM COATED ORAL DAILY
Qty: 90 TABLET | Refills: 0 | Status: SHIPPED | OUTPATIENT
Start: 2022-04-11 | End: 2022-07-05

## 2022-08-10 ENCOUNTER — OFFICE VISIT (OUTPATIENT)
Dept: FAMILY MEDICINE CLINIC | Age: 57
End: 2022-08-10
Payer: MEDICAID

## 2022-08-10 VITALS
BODY MASS INDEX: 30.05 KG/M2 | DIASTOLIC BLOOD PRESSURE: 80 MMHG | WEIGHT: 187 LBS | HEART RATE: 86 BPM | OXYGEN SATURATION: 97 % | SYSTOLIC BLOOD PRESSURE: 120 MMHG | TEMPERATURE: 97.1 F | HEIGHT: 66 IN | RESPIRATION RATE: 16 BRPM

## 2022-08-10 DIAGNOSIS — E11.65 UNCONTROLLED TYPE 2 DIABETES MELLITUS WITH HYPERGLYCEMIA (HCC): Primary | ICD-10-CM

## 2022-08-10 DIAGNOSIS — R35.0 URINARY FREQUENCY: ICD-10-CM

## 2022-08-10 LAB — HBA1C MFR BLD HPLC: 7.7 %

## 2022-08-10 PROCEDURE — 3051F HG A1C>EQUAL 7.0%<8.0%: CPT | Performed by: NURSE PRACTITIONER

## 2022-08-10 PROCEDURE — 83036 HEMOGLOBIN GLYCOSYLATED A1C: CPT | Performed by: NURSE PRACTITIONER

## 2022-08-10 PROCEDURE — 99214 OFFICE O/P EST MOD 30 MIN: CPT | Performed by: NURSE PRACTITIONER

## 2022-08-10 RX ORDER — PEN NEEDLE, DIABETIC 30 GX3/16"
NEEDLE, DISPOSABLE MISCELLANEOUS
Qty: 1 EACH | Refills: 11 | Status: SHIPPED | OUTPATIENT
Start: 2022-08-10

## 2022-08-10 RX ORDER — PANTOPRAZOLE SODIUM 40 MG/1
40 TABLET, DELAYED RELEASE ORAL
COMMUNITY
Start: 2022-06-10

## 2022-08-10 RX ORDER — LANOLIN ALCOHOL/MO/W.PET/CERES
1000 CREAM (GRAM) TOPICAL DAILY
COMMUNITY

## 2022-08-10 RX ORDER — SEMAGLUTIDE 1.34 MG/ML
0.25 INJECTION, SOLUTION SUBCUTANEOUS
Qty: 1 BOX | Refills: 2 | Status: SHIPPED | OUTPATIENT
Start: 2022-08-10 | End: 2022-10-07 | Stop reason: ALTCHOICE

## 2022-08-10 RX ORDER — LANOLIN ALCOHOL/MO/W.PET/CERES
50 CREAM (GRAM) TOPICAL
COMMUNITY
End: 2022-08-10 | Stop reason: ALTCHOICE

## 2022-08-10 RX ORDER — OMEPRAZOLE 20 MG/1
20 CAPSULE, DELAYED RELEASE ORAL
COMMUNITY
End: 2022-08-10 | Stop reason: ALTCHOICE

## 2022-08-10 NOTE — PROGRESS NOTES
Chief Complaint   Patient presents with    Cholesterol Problem    Diabetes    Hypertension    Back Pain     Left side pain, concerned about kidney pain has been occurring for 4-5 months      1. Have you been to the ER, urgent care clinic since your last visit? Hospitalized since your last visit? No    2. Have you seen or consulted any other health care providers outside of the [address] [place] [address] since your last visit? Include any pap smears or colon screening.  No

## 2022-08-10 NOTE — PROGRESS NOTES
Assessment/Plan:     Diagnoses and all orders for this visit:    1. Uncontrolled type 2 diabetes mellitus with hyperglycemia (HCC)  -     semaglutide (Ozempic) 0.25 mg or 0.5 mg/dose (2 mg/1.5 ml) subq pen; 0.25 mg by SubCUTAneous route every seven (7) days. -     Insulin Needles, Disposable, 31 gauge x 5/16\" ndle; Use as directed with Ozempic once weekly. Uncontrolled at 7.7%. He is maxed on metformin, glipizide, Jardiance. We will initiate Ozempic at this time. Plan to decrease or discontinue glipizide in 2 weeks. He will collect supplies and return for Ozempic teaching with pharmacist.  We will see him back in 3 months or sooner as needed. 2. Urinary frequency  -     URINALYSIS W/ RFLX MICROSCOPIC; Future  -     REFERRAL TO UROLOGY  Urine testing today. Likely prostate related. Referral to urology. Consider trial of Flomax 0.4 mg once daily if urinalysis is unremarkable. Follow-up and Dispositions    Return in about 3 months (around 11/10/2022) for Follow Up. Discussed expected course/resolution/complications of diagnosis in detail with patient. Medication risks/benefits/costs/interactions/alternatives discussed with patient. Pt was given after visit summary which includes diagnoses, current medications & vitals. Pt expressed understanding with the diagnosis and plan          Subjective:      Tashi Mcrae is a 49 y.o. male who presents for had concerns including Cholesterol Problem, Diabetes, Hypertension, and Back Pain (Left side pain, concerned about kidney pain has been occurring for 4-5 months ). Cardiovascular Review:  The patient has diabetes, hypertension, hyperlipidemia, and obesity. Diet and Lifestyle: not attempting to follow a low fat, low cholesterol diet, sedentary  Home BP Monitoring: is not measured at home.   Pertinent ROS: taking medications as instructed, no medication side effects noted, no TIA's, no chest pain on exertion, has dyspnea on exertion, no swelling of ankles. Reports a several month history of increasing urinary frequency. Symptoms are day and night. Reports weak stream as well as difficulty initiating stream.  This is his first evaluation. He would like to follow-up with urology. He denies fever, chills, body aches. Symptoms are worsening over time. Patient Active Problem List   Diagnosis Code    Uncontrolled type 2 diabetes circulatory disorder erectile dysfunction [id number]    Sleep apnea, obstructive G47.33    TB lung, latent Z22.7    Insomnia G47.00    Mixed hyperlipidemia E78.2    Peripheral vascular disease (HCC) I73.9    Uncontrolled type 2 diabetes mellitus with hyperglycemia (HCC) E11.65    Obesity (BMI 30-39. 9) E66.9    Esophageal reflux K21.9    Fatty liver K76.0    Hypertension I10       Current Outpatient Medications   Medication Sig Dispense Refill    pantoprazole (PROTONIX) 40 mg tablet Take 40 mg by mouth Daily (before breakfast). cyanocobalamin 1,000 mcg tablet Take 1,000 mcg by mouth in the morning. semaglutide (Ozempic) 0.25 mg or 0.5 mg/dose (2 mg/1.5 ml) subq pen 0.25 mg by SubCUTAneous route every seven (7) days. 1 Box 2    Insulin Needles, Disposable, 31 gauge x 5/16\" ndle Use as directed with Ozempic once weekly. 1 Each 11    atorvastatin (LIPITOR) 20 mg tablet TAKE 1 TABLET BY MOUTH DAILY 90 Tablet 3    sildenafil citrate (VIAGRA) 25 mg tablet Take 1 Tablet by mouth as needed (ED). 10 Tablet 4    empagliflozin (Jardiance) 25 mg tablet Take 1 Tablet by mouth daily. 90 Tablet 3    metFORMIN ER (GLUCOPHAGE XR) 500 mg tablet Take 4 Tablets by mouth Daily (before breakfast).  360 Tablet 3    glipiZIDE SR (GLUCOTROL XL) 10 mg CR tablet Take 1 tab by mouth every day in addition to the Glucotrol XL 5 mg for a total daily dose of 15 mg. 90 Tablet 3    diclofenac EC (VOLTAREN) 75 mg EC tablet TAKE 1 TABLET BY MOUTH TWICE A DAY WITH MEALS 60 Tab 3    Blood-Glucose Meter monitoring kit Use to measure blood sugar 2 time daily and as needed. 1 kit 0    Lancets misc Use to measure blood sugar 2 times daily and as needed 1 Package 11    glucose blood VI test strips (ASCENSIA AUTODISC VI, ONE TOUCH ULTRA TEST VI) strip Use to measure blood sugar 2 times daily and as needed 1 Package 11       No Known Allergies    ROS:   Review of Systems   Constitutional:  Negative for malaise/fatigue. Eyes:  Negative for blurred vision. Respiratory:  Negative for shortness of breath. Cardiovascular:  Negative for chest pain. Objective:   Visit Vitals  /80 (BP 1 Location: Right arm, BP Patient Position: Sitting, BP Cuff Size: Adult)   Pulse 86   Temp 97.1 °F (36.2 °C) (Temporal)   Resp 16   Ht 5' 6.14\" (1.68 m)   Wt 187 lb (84.8 kg)   SpO2 97%   BMI 30.05 kg/m²       Vitals and Nurse Documentation reviewed. Physical Exam  Constitutional:       General: He is not in acute distress. Appearance: He is obese. Cardiovascular:      Heart sounds: S1 normal and S2 normal. No murmur heard. No friction rub. No gallop. Pulmonary:      Effort: No respiratory distress. Breath sounds: Normal breath sounds. Skin:     General: Skin is warm and dry.    Psychiatric:         Mood and Affect: Mood and affect normal.

## 2022-08-11 LAB
APPEARANCE UR: CLEAR
BILIRUB UR QL: NEGATIVE
COLOR UR: ABNORMAL
GLUCOSE UR STRIP.AUTO-MCNC: >1000 MG/DL
HGB UR QL STRIP: NEGATIVE
KETONES UR QL STRIP.AUTO: NEGATIVE MG/DL
LEUKOCYTE ESTERASE UR QL STRIP.AUTO: NEGATIVE
NITRITE UR QL STRIP.AUTO: NEGATIVE
PH UR STRIP: 5.5 [PH] (ref 5–8)
PROT UR STRIP-MCNC: NEGATIVE MG/DL
SP GR UR REFRACTOMETRY: 1.01 (ref 1–1.03)
UROBILINOGEN UR QL STRIP.AUTO: 0.2 EU/DL (ref 0.2–1)

## 2022-09-27 DIAGNOSIS — N52.9 ERECTILE DYSFUNCTION, UNSPECIFIED ERECTILE DYSFUNCTION TYPE: ICD-10-CM

## 2022-09-27 NOTE — TELEPHONE ENCOUNTER
Patient called checking the status of his viagra request.    Requesting a call back    Best call back# [phone]

## 2022-09-27 NOTE — TELEPHONE ENCOUNTER
Last Visit: 8/10/22 NP Katty  Next Appointment: None  Previous Refill Encounter(s): 3/9/22 10 + 4    Requested Prescriptions     Pending Prescriptions Disp Refills    sildenafil citrate (VIAGRA) 25 mg tablet 10 Tablet 4     Sig: Take 1 Tablet by mouth as needed (ED). For [facility] in place:   Recommendation Provided To:    Intervention Detail: New Rx: 1, reason: Patient Preference  Gap Closed?:   Intervention Accepted By:   Time Spent (min): 5

## 2022-09-29 RX ORDER — SILDENAFIL 25 MG/1
25 TABLET, FILM COATED ORAL AS NEEDED
Qty: 10 TABLET | Refills: 4 | Status: SHIPPED | OUTPATIENT
Start: 2022-09-29

## 2022-10-25 DIAGNOSIS — E78.2 MIXED HYPERLIPIDEMIA: ICD-10-CM

## 2022-10-26 RX ORDER — ATORVASTATIN CALCIUM 20 MG/1
20 TABLET, FILM COATED ORAL DAILY
Qty: 90 TABLET | Refills: 3 | Status: SHIPPED | OUTPATIENT
Start: 2022-10-26

## (undated) DEVICE — FORCEPS BX L240CM JAW DIA2.8MM L CAP W/ NDL MIC MESH TOOTH

## (undated) DEVICE — TUBING HYDR IRR --